# Patient Record
Sex: FEMALE | Race: WHITE | NOT HISPANIC OR LATINO | Employment: FULL TIME | URBAN - METROPOLITAN AREA
[De-identification: names, ages, dates, MRNs, and addresses within clinical notes are randomized per-mention and may not be internally consistent; named-entity substitution may affect disease eponyms.]

---

## 2018-08-07 ENCOUNTER — HOSPITAL ENCOUNTER (EMERGENCY)
Facility: CLINIC | Age: 18
Discharge: HOME | End: 2018-08-07
Attending: EMERGENCY MEDICINE | Admitting: EMERGENCY MEDICINE

## 2018-08-07 ENCOUNTER — APPOINTMENT (OUTPATIENT)
Dept: CT IMAGING | Facility: CLINIC | Age: 18
End: 2018-08-07
Attending: EMERGENCY MEDICINE

## 2018-08-07 VITALS
OXYGEN SATURATION: 99 % | WEIGHT: 130 LBS | SYSTOLIC BLOOD PRESSURE: 108 MMHG | DIASTOLIC BLOOD PRESSURE: 62 MMHG | HEIGHT: 62 IN | BODY MASS INDEX: 23.92 KG/M2 | TEMPERATURE: 98.6 F | HEART RATE: 68 BPM

## 2018-08-07 DIAGNOSIS — R55 SYNCOPE, UNSPECIFIED SYNCOPE TYPE: ICD-10-CM

## 2018-08-07 DIAGNOSIS — S09.90XA CLOSED HEAD INJURY, INITIAL ENCOUNTER: ICD-10-CM

## 2018-08-07 DIAGNOSIS — S16.1XXA STRAIN OF NECK MUSCLE, INITIAL ENCOUNTER: ICD-10-CM

## 2018-08-07 LAB
ANION GAP SERPL CALCULATED.3IONS-SCNC: 7 MMOL/L (ref 3–14)
BASOPHILS # BLD AUTO: 0 10E9/L (ref 0–0.2)
BASOPHILS NFR BLD AUTO: 0.2 %
BUN SERPL-MCNC: 14 MG/DL (ref 7–30)
CALCIUM SERPL-MCNC: 9.4 MG/DL (ref 8.5–10.1)
CHLORIDE SERPL-SCNC: 107 MMOL/L (ref 94–109)
CO2 SERPL-SCNC: 25 MMOL/L (ref 20–32)
CREAT SERPL-MCNC: 0.72 MG/DL (ref 0.52–1.04)
DIFFERENTIAL METHOD BLD: NORMAL
EOSINOPHIL # BLD AUTO: 0 10E9/L (ref 0–0.7)
EOSINOPHIL NFR BLD AUTO: 0.4 %
ERYTHROCYTE [DISTWIDTH] IN BLOOD BY AUTOMATED COUNT: 12.4 % (ref 10–15)
GFR SERPL CREATININE-BSD FRML MDRD: >90 ML/MIN/1.7M2
GLUCOSE SERPL-MCNC: 86 MG/DL (ref 70–99)
HCG UR QL: NEGATIVE
HCT VFR BLD AUTO: 42.9 % (ref 35–47)
HGB BLD-MCNC: 14.1 G/DL (ref 11.7–15.7)
IMM GRANULOCYTES # BLD: 0 10E9/L (ref 0–0.4)
IMM GRANULOCYTES NFR BLD: 0.3 %
LYMPHOCYTES # BLD AUTO: 1.8 10E9/L (ref 0.8–5.3)
LYMPHOCYTES NFR BLD AUTO: 18.9 %
MCH RBC QN AUTO: 30.3 PG (ref 26.5–33)
MCHC RBC AUTO-ENTMCNC: 32.9 G/DL (ref 31.5–36.5)
MCV RBC AUTO: 92 FL (ref 78–100)
MONOCYTES # BLD AUTO: 0.4 10E9/L (ref 0–1.3)
MONOCYTES NFR BLD AUTO: 4.1 %
NEUTROPHILS # BLD AUTO: 7.1 10E9/L (ref 1.6–8.3)
NEUTROPHILS NFR BLD AUTO: 76.1 %
NRBC # BLD AUTO: 0 10*3/UL
NRBC BLD AUTO-RTO: 0 /100
PLATELET # BLD AUTO: 292 10E9/L (ref 150–450)
POTASSIUM SERPL-SCNC: 4.2 MMOL/L (ref 3.4–5.3)
RBC # BLD AUTO: 4.65 10E12/L (ref 3.8–5.2)
SODIUM SERPL-SCNC: 139 MMOL/L (ref 133–144)
WBC # BLD AUTO: 9.3 10E9/L (ref 4–11)

## 2018-08-07 PROCEDURE — 99285 EMERGENCY DEPT VISIT HI MDM: CPT | Mod: 25 | Performed by: EMERGENCY MEDICINE

## 2018-08-07 PROCEDURE — 80048 BASIC METABOLIC PNL TOTAL CA: CPT | Performed by: EMERGENCY MEDICINE

## 2018-08-07 PROCEDURE — 25000128 H RX IP 250 OP 636: Performed by: EMERGENCY MEDICINE

## 2018-08-07 PROCEDURE — 85025 COMPLETE CBC W/AUTO DIFF WBC: CPT | Performed by: EMERGENCY MEDICINE

## 2018-08-07 PROCEDURE — 93010 ELECTROCARDIOGRAM REPORT: CPT | Mod: Z6 | Performed by: EMERGENCY MEDICINE

## 2018-08-07 PROCEDURE — 99285 EMERGENCY DEPT VISIT HI MDM: CPT | Mod: 25

## 2018-08-07 PROCEDURE — 96360 HYDRATION IV INFUSION INIT: CPT

## 2018-08-07 PROCEDURE — 70450 CT HEAD/BRAIN W/O DYE: CPT

## 2018-08-07 PROCEDURE — 93005 ELECTROCARDIOGRAM TRACING: CPT

## 2018-08-07 PROCEDURE — 72125 CT NECK SPINE W/O DYE: CPT

## 2018-08-07 PROCEDURE — 81025 URINE PREGNANCY TEST: CPT | Performed by: EMERGENCY MEDICINE

## 2018-08-07 RX ORDER — SODIUM CHLORIDE, SODIUM LACTATE, POTASSIUM CHLORIDE, CALCIUM CHLORIDE 600; 310; 30; 20 MG/100ML; MG/100ML; MG/100ML; MG/100ML
1000 INJECTION, SOLUTION INTRAVENOUS CONTINUOUS
Status: DISCONTINUED | OUTPATIENT
Start: 2018-08-07 | End: 2018-08-07 | Stop reason: HOSPADM

## 2018-08-07 RX ADMIN — SODIUM CHLORIDE, POTASSIUM CHLORIDE, SODIUM LACTATE AND CALCIUM CHLORIDE 1000 ML: 600; 310; 30; 20 INJECTION, SOLUTION INTRAVENOUS at 09:28

## 2018-08-07 NOTE — ED NOTES
"After syncopal episode pt c/o ha and neck pain from fall.  No numbness/tingling.  = and strong in all extremities.  Pt A&O x3.  Denies cp or soa.  Hx of mild cold otherwise healthy.  Pt states she has had a hx of \"passing out\" in the past due to eating disorder but states she has been eating well and has not passed out for the past three years.  "

## 2018-08-07 NOTE — ED PROVIDER NOTES
History     Chief Complaint   Patient presents with     Loss of Consciousness     pt had syncopal episode at work and hit head on floor.  LOC for about 3-5 min according to what EMS was told     HPI  Marija Langley is a 18 year old female who presents after reported syncopal episode.  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  She reports eating a smoothie this morning feeling well, being at work and standing for approximately an hour at which time she said she started to feel nauseated and the next thing she knew she woke up on the floor.  She was told she hit her head hard when she fell, she complains of left occipital and neck pain, there was no report of seizure activity, no loss control bowel or bladder.  She denies significant headache, nausea, visual change, peripheral numbness weakness or paresthesia.  Denies pain in her back or chest.  She has history of eating disorder, she has been through treatment a couple years ago, is following a meal plan and reportedly doing well with intake on a daily basis.  She reports drinking plenty of fluids.  She has history of syncopal episode when she was not taking care of herself with respect to her eating disorder.  She has had URI symptoms for the past week.  She denies nausea vomiting, chest pain, shortness of air, palpitations, abdominal pain, diarrhea, black or bloody stool, urinary symptoms.  LMP 3 weeks ago, sexually active using condoms.    Problem List:    There are no active problems to display for this patient.       Past Medical History:    No past medical history on file.    Past Surgical History:    No past surgical history on file.    Family History:    No family history on file.    Social History:  Marital Status:    Social History   Substance Use Topics     Smoking status: Not on file     Smokeless tobacco: Not on file     Alcohol use Not on file        Medications:      Acetaminophen (TYLENOL PO)         Review of Systems  All  "other systems reviewed and are negative.    Physical Exam   BP: 107/54  Pulse: 69  Temp: 98.6  F (37  C)  Height: 157.5 cm (5' 2\")  Weight: 59 kg (130 lb)  SpO2: 100 %      Physical Exam  Nontoxic-appearing no respiratory distress alert and oriented x3.  GCS 15 on arrival and discharge    Head atraumatic normocephalic    Cranial nerves; vision baseline fields intact, PERRL, EOMI, facial sensation intact to light touch, facial muscle tone intact and symmetrical, hearing grossly intact,swallowing without difficulty, voice baseline and normal, SCM  strength intact, tongue protrudes midline.  Palatal elevation symmetric    TM's unremarkable, EACs clear, oropharynx moist without lesions or erythema.    Neck rigid collar, left in place secondary to complaint of neck pain after fall    Spine nontender to palpation    Pelvis stable nontender    Lungs clear to auscultation no rales rhonchi or wheezes    Heart regular no murmur S3 or rub    Abdomen soft nontender bowel sounds positive no masses or HSM    Strength and sensation intact throughout the extremities, skin clear from rash or lesion.      ED Course     ED Course     Procedures  ECG: Normal rate and rhythm, axis and intervals within normal limits, no acute ST or T wave changes. Read by Dr. Agustín Herring               Critical Care time:  none     Results for orders placed or performed during the hospital encounter of 08/07/18   CT Head w/o Contrast    Narrative    CT SCAN OF THE HEAD WITHOUT CONTRAST   8/7/2018 9:45 AM     HISTORY: Closed head injury with loss consciousness    TECHNIQUE:  Axial images of the head and coronal reformations without  IV contrast material. Radiation dose for this scan was reduced using  automated exposure control, adjustment of the mA and/or kV according  to patient size, or iterative reconstruction technique.    COMPARISON: None.    FINDINGS:  The ventricles are normal in size, shape and configuration.   The brain parenchyma and " subarachnoid spaces are normal. There is no  evidence of intracranial hemorrhage, mass, acute infarct or anomaly.     The visualized portions of the sinuses and mastoids appear normal.  There is no evidence of trauma.      Impression    IMPRESSION: Normal CT scan of the head.      JESSICA FORD MD   CT Cervical Spine w/o Contrast    Narrative    CT CERVICAL SPINE WITHOUT CONTRAST  8/7/2018 9:46 AM     HISTORY: Neck pain after trauma.      TECHNIQUE: Axial images of the cervical spine were obtained without  intravenous contrast. Multiplanar reformations were performed.  Radiation dose for this scan was reduced using automated exposure  control, adjustment of the mA and/or kV according to patient size, or  iterative reconstruction technique.    COMPARISON: None.    FINDINGS: There is no evidence of fracture.    Alignment: Straightening of lordosis. Mild scoliosis convex to the  left.    Craniocervical Junction and C1-C2: The anterior arch of C1 appears  fused to the tip of the clivus on sagittal images 13 and 14 of series  7, a congenital anomaly. Small congenital defect in the posterior arch  of C1.    C2-C3: Normal disc, facet joints, spinal canal and neural foramina.    C3-C4: Normal disc, facet joints, spinal canal and neural foramina.    C4-C5: Normal disc, facet joints, spinal canal and neural foramina.    C5-C6: Normal disc, facet joints, spinal canal and neural foramina.    C6-C7: Normal disc, facet joints, spinal canal and neural foramina.    C7-T1: Normal disc, facet joints, spinal canal and neural foramina.  Bilateral cervical ribs off C7. The right cervical rib articulates  with the first right thoracic rib.    Paraspinous Soft Tissues: The thyroid gland is small with  heterogeneous parenchymal attenuation and lobulated peripheral surface  suggesting previous thyroiditis.      Impression    IMPRESSION:   1. No evidence of acute trauma.  2. 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  3. Abnormal appearance of the thyroid gland suggesting previous  thyroiditis.    JESSICA FORD MD   CBC with platelets differential   Result Value Ref Range    WBC 9.3 4.0 - 11.0 10e9/L    RBC Count 4.65 3.8 - 5.2 10e12/L    Hemoglobin 14.1 11.7 - 15.7 g/dL    Hematocrit 42.9 35.0 - 47.0 %    MCV 92 78 - 100 fl    MCH 30.3 26.5 - 33.0 pg    MCHC 32.9 31.5 - 36.5 g/dL    RDW 12.4 10.0 - 15.0 %    Platelet Count 292 150 - 450 10e9/L    Diff Method Automated Method     % Neutrophils 76.1 %    % Lymphocytes 18.9 %    % Monocytes 4.1 %    % Eosinophils 0.4 %    % Basophils 0.2 %    % Immature Granulocytes 0.3 %    Nucleated RBCs 0 0 /100    Absolute Neutrophil 7.1 1.6 - 8.3 10e9/L    Absolute Lymphocytes 1.8 0.8 - 5.3 10e9/L    Absolute Monocytes 0.4 0.0 - 1.3 10e9/L    Absolute Eosinophils 0.0 0.0 - 0.7 10e9/L    Absolute Basophils 0.0 0.0 - 0.2 10e9/L    Abs Immature Granulocytes 0.0 0 - 0.4 10e9/L    Absolute Nucleated RBC 0.0    Basic metabolic panel   Result Value Ref Range    Sodium 139 133 - 144 mmol/L    Potassium 4.2 3.4 - 5.3 mmol/L    Chloride 107 94 - 109 mmol/L    Carbon Dioxide 25 20 - 32 mmol/L    Anion Gap 7 3 - 14 mmol/L    Glucose 86 70 - 99 mg/dL    Urea Nitrogen 14 7 - 30 mg/dL    Creatinine 0.72 0.52 - 1.04 mg/dL    GFR Estimate >90 >60 mL/min/1.7m2    GFR Estimate If Black >90 >60 mL/min/1.7m2    Calcium 9.4 8.5 - 10.1 mg/dL   HCG qualitative urine   Result Value Ref Range    HCG Qual Urine Negative NEG^Negative                 Results for orders placed or performed during the hospital encounter of 08/07/18 (from the past 24 hour(s))   CBC with platelets differential   Result Value Ref Range    WBC 9.3 4.0 - 11.0 10e9/L    RBC Count 4.65 3.8 - 5.2 10e12/L    Hemoglobin 14.1 11.7 - 15.7 g/dL    Hematocrit 42.9 35.0 - 47.0 %    MCV 92 78 - 100 fl    MCH 30.3 26.5 - 33.0 pg     MCHC 32.9 31.5 - 36.5 g/dL    RDW 12.4 10.0 - 15.0 %    Platelet Count 292 150 - 450 10e9/L    Diff Method Automated Method     % Neutrophils 76.1 %    % Lymphocytes 18.9 %    % Monocytes 4.1 %    % Eosinophils 0.4 %    % Basophils 0.2 %    % Immature Granulocytes 0.3 %    Nucleated RBCs 0 0 /100    Absolute Neutrophil 7.1 1.6 - 8.3 10e9/L    Absolute Lymphocytes 1.8 0.8 - 5.3 10e9/L    Absolute Monocytes 0.4 0.0 - 1.3 10e9/L    Absolute Eosinophils 0.0 0.0 - 0.7 10e9/L    Absolute Basophils 0.0 0.0 - 0.2 10e9/L    Abs Immature Granulocytes 0.0 0 - 0.4 10e9/L    Absolute Nucleated RBC 0.0    Basic metabolic panel   Result Value Ref Range    Sodium 139 133 - 144 mmol/L    Potassium 4.2 3.4 - 5.3 mmol/L    Chloride 107 94 - 109 mmol/L    Carbon Dioxide 25 20 - 32 mmol/L    Anion Gap 7 3 - 14 mmol/L    Glucose 86 70 - 99 mg/dL    Urea Nitrogen 14 7 - 30 mg/dL    Creatinine 0.72 0.52 - 1.04 mg/dL    GFR Estimate >90 >60 mL/min/1.7m2    GFR Estimate If Black >90 >60 mL/min/1.7m2    Calcium 9.4 8.5 - 10.1 mg/dL   HCG qualitative urine   Result Value Ref Range    HCG Qual Urine Negative NEG^Negative   CT Head w/o Contrast    Narrative    CT SCAN OF THE HEAD WITHOUT CONTRAST   8/7/2018 9:45 AM     HISTORY: Closed head injury with loss consciousness    TECHNIQUE:  Axial images of the head and coronal reformations without  IV contrast material. Radiation dose for this scan was reduced using  automated exposure control, adjustment of the mA and/or kV according  to patient size, or iterative reconstruction technique.    COMPARISON: None.    FINDINGS:  The ventricles are normal in size, shape and configuration.   The brain parenchyma and subarachnoid spaces are normal. There is no  evidence of intracranial hemorrhage, mass, acute infarct or anomaly.     The visualized portions of the sinuses and mastoids appear normal.  There is no evidence of trauma.      Impression    IMPRESSION: Normal CT scan of the head.      JESSICA FORD  MD   CT Cervical Spine w/o Contrast    Narrative    CT CERVICAL SPINE WITHOUT CONTRAST  8/7/2018 9:46 AM     HISTORY: Neck pain after trauma.      TECHNIQUE: Axial images of the cervical spine were obtained without  intravenous contrast. Multiplanar reformations were performed.  Radiation dose for this scan was reduced using automated exposure  control, adjustment of the mA and/or kV according to patient size, or  iterative reconstruction technique.    COMPARISON: None.    FINDINGS: There is no evidence of fracture.    Alignment: Straightening of lordosis. Mild scoliosis convex to the  left.    Craniocervical Junction and C1-C2: The anterior arch of C1 appears  fused to the tip of the clivus on sagittal images 13 and 14 of series  7, a congenital anomaly. Small congenital defect in the posterior arch  of C1.    C2-C3: Normal disc, facet joints, spinal canal and neural foramina.    C3-C4: Normal disc, facet joints, spinal canal and neural foramina.    C4-C5: Normal disc, facet joints, spinal canal and neural foramina.    C5-C6: Normal disc, facet joints, spinal canal and neural foramina.    C6-C7: Normal disc, facet joints, spinal canal and neural foramina.    C7-T1: Normal disc, facet joints, spinal canal and neural foramina.  Bilateral cervical ribs off C7. The right cervical rib articulates  with the first right thoracic rib.    Paraspinous Soft Tissues: The thyroid gland is small with  heterogeneous parenchymal attenuation and lobulated peripheral surface  suggesting previous thyroiditis.      Impression    IMPRESSION:   1. No evidence of acute trauma.  2. 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  3. Abnormal appearance of the thyroid gland suggesting previous  thyroiditis.    JESSICA FORD MD       Medications   lactated ringers BOLUS 1,000 mL (1,000 mLs Intravenous  New Bag 8/7/18 0928)     Followed by   lactated ringers infusion (not administered)       Assessments & Plan (with Medical Decision Making)  18-year-old female presents after syncopal episode with prolonged reported loss consciousness.  No evidence for seizure.  Usual differential considered, unrevealing workup including CT scan head and neck, CBC, BMP and urine pregnancy.  She remained stable throughout her stay without ectopy or further faintness.  She received 1 L normal saline.  Discussed possible etiologies, vasovagal most likely diagnosis.  Patient suffered closed head injury and discussed concussion symptoms.  Reviewed return criteria, she expressed understanding and agreement, all questions answered.     I have reviewed the nursing notes.    I have reviewed the findings, diagnosis, plan and need for follow up with the patient.       New Prescriptions    No medications on file       Final diagnoses:   Syncope, unspecified syncope type   Closed head injury, initial encounter   Strain of neck muscle, initial encounter       8/7/2018   [facility] EMERGENCY DEPARTMENT     Agustín Herring MD  08/07/18 5713

## 2018-08-07 NOTE — ED AVS SNAPSHOT
[facility] Emergency Department    [address] [address]    Phone:  [phone]    Fax:  [phone]                                       Marija Langley   MRN: [id number]    Department:  [facility] Emergency Department   Date of Visit:  8/7/2018           After Visit Summary Signature Page     I have received my discharge instructions, and my questions have been answered. I have discussed any challenges I see with this plan with the nurse or doctor.    ..........................................................................................................................................  Patient/Patient Representative Signature      ..........................................................................................................................................  Patient Representative Print Name and Relationship to Patient    ..................................................               ................................................  Date                                            Time    ..........................................................................................................................................  Reviewed by Signature/Title    ...................................................              ..............................................  Date                                                            Time

## 2018-08-07 NOTE — DISCHARGE INSTRUCTIONS
Understanding Cervical Strain    There are 7 bones (vertebrae) in the neck that are part of the spine. These are called the cervical spine. Cervical strain is a medical term for neck pain. The neck has several layers of muscles. These are connected with tendons to the cervical spine and other bones. Neck pain is often the result of injury to these muscles and tendons.  Causes of cervical strain  Different types of stress on the neck can damage muscles and tendons (soft tissues) and cause cervical strain. Cervical tissues can be damaged by:    The neck being forced past its normal range of motion, such as in a car accident or sports injury    Constant, low-level stress, such as from poor posture or a poorly set-up workspace  Symptoms of cervical strain  These may include:    Neck pain or stiffness    Pain in the shoulders or upper back    Muscle spasms    Headache, often starting at the base of the neck    Irritability, difficulty concentrating, or sleeplessness  Treatment for cervical strain  This problem often gets better on its own. Treatments aim to reduce pain and inflammation and increase the range of motion of the neck. Possible treatments include:    Over-the-counter or prescription pain medicine. These help relieve pain and inflammation.    Stretching exercises to decrease neck stiffness.    Massage to decrease neck stiffness.    Cold or heat pack. These help reduce pain and swelling.  Call 911  Call 911 right away if you have any of these:    Face drooping or numbness    Numbness or weakness, especially in the arms or on one side    Slurred speech or difficulty speaking    Blurred vision   When to call your healthcare provider  Call your healthcare provider right away if you have any of these:    Fever of 100.4 F (38 C) or higher, or as directed    Pain or stiffness that gets worse    Symptoms that don t get better, or get worse    Numbness, tingling, weakness or shooting pains into the arms or  legs    New symptoms  Date Last Reviewed: 3/10/2016    6787-8649 [employer]. [address] [facility], [place] [address]. All rights reserved. This information is not intended as a substitute for professional medical care. Always follow your healthcare professional's instructions.          After a Concussion     Awaken to check alertness as often as the health care provider suggests.     If you had a mild concussion (a head injury), watch closely for signs of problems during the first 48 hours after the injury. Follow the doctor s advice about recovering at home. Use the tips on this handout as a guide.  Note: You should not be left alone after a concussion. If no adult can stay with the injured person, let the doctor know.  Have someone call 911 or your emergency number if you can't fully wake up or have a seizures or convulsions.   The first 48 hours  Don t take medicine unless approved by your healthcare provider. Try placing a cold, damp cloth on your head to help relieve a headache.    Ask the doctor before using any medicines.    Don't drink alcohol or take sedatives or medicines that make you sleepy.    Don't return to sports or any activity that could cause you to hit your head until all symptoms are gone and you have been cleared by your doctor. A second head injury before fully recovering from the first one can lead to serious brain injury.    Don't do activities that need a lot of concentration or a lot of attention. This will allow your brain to rest and heal more quickly.    Return to regular physical and mental activity as directed and approved by your healthcare provider.  Tips about sleeping  For the first day or two, it may be best not to sleep for long periods of time without being checked for alertness. Follow the doctor s instructions.  ? Have someone wake you every ____ hours for the next ____ hours. He or she should ask you questions to check for alertness.  ? OK to sleep through  the night.     When to call the healthcare provider  If you notice any of the following, call the healthcare provider:    Vomiting. Some vomiting is common, but tell the provider about any vomiting.    Clear or bloody drainage from the nose or ear    Constant drowsiness or difficulty in waking up    Confusion or memory loss    Blurred vision or any vision changes    Inability to walk or talk normally    Increased weakness or problems with coordination    Constant, unrelieved headache that becomes more severe    Changes in behavior or personality    High-pitched crying in infants    Signs of stroke such as paralysis of parts of the body    Uncrontrolled movements suggesting a seizure   Date Last Reviewed: 12/1/2017 2000-2017 [employer]. [address] [place], [place] [id number]. All rights reserved. This information is not intended as a substitute for professional medical care. Always follow your healthcare professional's instructions.        Fainting: Uncertain Cause  Fainting (syncope) is a temporary loss of consciousness, which is often associated with a loss of postural tone. There are other causes of fainting, too. It s also called passing out. It occurs when blood flow to the brain is less than normal. Near-fainting (near-syncope) is very similar to fainting, but you don t fully pass out.  Common minor causes of fainting include:    Sudden fear    Pain    Nausea    Emotional stress    Overexertion  Suddenly standing up after sitting or lying for a long time can also cause fainting.  More serious causes of fainting include:    Very slow or very fast heartbeat (arrhythmia)    Other types of heart disease, such as heart valve disease or coronary artery disease    Dehydration    Loss of blood    Seizure    Stroke    Ruptured blood vessel in the brain  Taking too much high blood pressure medicine can also cause low blood pressure and fainting.  Your healthcare provider does not know the exact cause  of your fainting. But the tests today did not show any of the serious causes of fainting. Sometimes you may need more tests to find out if you have a serious problem. That s why it s important to follow up with your provider as advised.  Home care  Follow these guidelines when caring for yourself at home:    Rest today. You may go back to your normal activities when you are feeling back to normal. It is best to stay with someone who can check on you for the next 24 hours to watch for another episode of fainting.    If you become lightheaded or dizzy, lie down right away and try to prop your feet above the level of your head. Or sit with your head between your knees.    Because the provider doesn t know the exact cause of your fainting or near-fainting spell, it s possible for you to have another spell without warning. Because of this, don t drive a car or operate dangerous equipment. Don t take a bath alone. Use a shower instead. Don t swim alone until your healthcare provider says that you are no longer in danger of having another fainting spell.  Follow-up care  Follow up with your healthcare provider, or as advised.  Call 911  Call 911 if any of these occur:    Another fainting spell that s not explained by the common causes listed above    Pain in your chest, arm, neck, jaw, back, or abdomen    Shortness of breath    Severe headache or seizure    Blood in vomit or stools (black or red color)    Unexpected vaginal bleeding    Your heart beats very rapidly, very slowly, or irregularly (palpitations)    Weakness in an arm or leg or on one side of the face    Difficulty speaking or seeing    Extreme drowsiness, confusion, dizziness, or fainting  Date Last Reviewed: 12/1/2016 2000-2017 [employer]. [address] [place] [address], [place] [address]. All rights reserved. This information is not intended as a substitute for professional medical care. Always follow your healthcare professional's  instructions.

## 2018-08-07 NOTE — ED AVS SNAPSHOT
[facility] Emergency Department    [address]    [facility] [address]    Phone:  [phone]    Fax:  [phone]                                       Marija Langley   MRN: [id number]    Department:  [facility] Emergency Department   Date of Visit:  8/7/2018           Patient Information     Date Of Birth          2000        Your diagnoses for this visit were:     Syncope, unspecified syncope type     Closed head injury, initial encounter     Strain of neck muscle, initial encounter        You were seen by Agustín Herring MD.        Discharge Instructions           Understanding Cervical Strain    There are 7 bones (vertebrae) in the neck that are part of the spine. These are called the cervical spine. Cervical strain is a medical term for neck pain. The neck has several layers of muscles. These are connected with tendons to the cervical spine and other bones. Neck pain is often the result of injury to these muscles and tendons.  Causes of cervical strain  Different types of stress on the neck can damage muscles and tendons (soft tissues) and cause cervical strain. Cervical tissues can be damaged by:    The neck being forced past its normal range of motion, such as in a car accident or sports injury    Constant, low-level stress, such as from poor posture or a poorly set-up workspace  Symptoms of cervical strain  These may include:    Neck pain or stiffness    Pain in the shoulders or upper back    Muscle spasms    Headache, often starting at the base of the neck    Irritability, difficulty concentrating, or sleeplessness  Treatment for cervical strain  This problem often gets better on its own. Treatments aim to reduce pain and inflammation and increase the range of motion of the neck. Possible treatments include:    Over-the-counter or prescription pain medicine. These help relieve pain and inflammation.    Stretching exercises to decrease neck stiffness.    Massage to decrease neck  stiffness.    Cold or heat pack. These help reduce pain and swelling.  Call 911  Call 911 right away if you have any of these:    Face drooping or numbness    Numbness or weakness, especially in the arms or on one side    Slurred speech or difficulty speaking    Blurred vision   When to call your healthcare provider  Call your healthcare provider right away if you have any of these:    Fever of 100.4 F (38 C) or higher, or as directed    Pain or stiffness that gets worse    Symptoms that don t get better, or get worse    Numbness, tingling, weakness or shooting pains into the arms or legs    New symptoms  Date Last Reviewed: 3/10/2016    [id number] [employer]. [address] [place] [address], [place] [address]. All rights reserved. This information is not intended as a substitute for professional medical care. Always follow your healthcare professional's instructions.          After a Concussion     Awaken to check alertness as often as the health care provider suggests.     If you had a mild concussion (a head injury), watch closely for signs of problems during the first 48 hours after the injury. Follow the doctor s advice about recovering at home. Use the tips on this handout as a guide.  Note: You should not be left alone after a concussion. If no adult can stay with the injured person, let the doctor know.  Have someone call 911 or your emergency number if you can't fully wake up or have a seizures or convulsions.   The first 48 hours  Don t take medicine unless approved by your healthcare provider. Try placing a cold, damp cloth on your head to help relieve a headache.    Ask the doctor before using any medicines.    Don't drink alcohol or take sedatives or medicines that make you sleepy.    Don't return to sports or any activity that could cause you to hit your head until all symptoms are gone and you have been cleared by your doctor. A second head injury before fully recovering from the first one can  lead to serious brain injury.    Don't do activities that need a lot of concentration or a lot of attention. This will allow your brain to rest and heal more quickly.    Return to regular physical and mental activity as directed and approved by your healthcare provider.  Tips about sleeping  For the first day or two, it may be best not to sleep for long periods of time without being checked for alertness. Follow the doctor s instructions.  ? Have someone wake you every ____ hours for the next ____ hours. He or she should ask you questions to check for alertness.  ? OK to sleep through the night.     When to call the healthcare provider  If you notice any of the following, call the healthcare provider:    Vomiting. Some vomiting is common, but tell the provider about any vomiting.    Clear or bloody drainage from the nose or ear    Constant drowsiness or difficulty in waking up    Confusion or memory loss    Blurred vision or any vision changes    Inability to walk or talk normally    Increased weakness or problems with coordination    Constant, unrelieved headache that becomes more severe    Changes in behavior or personality    High-pitched crying in infants    Signs of stroke such as paralysis of parts of the body    Uncrontrolled movements suggesting a seizure   Date Last Reviewed: 12/1/2017 2000-2017 [employer]. [address] [place] [address]. All rights reserved. This information is not intended as a substitute for professional medical care. Always follow your healthcare professional's instructions.        Fainting: Uncertain Cause  Fainting (syncope) is a temporary loss of consciousness, which is often associated with a loss of postural tone. There are other causes of fainting, too. It s also called passing out. It occurs when blood flow to the brain is less than normal. Near-fainting (near-syncope) is very similar to fainting, but you don t fully pass out.  Common minor causes of  fainting include:    Sudden fear    Pain    Nausea    Emotional stress    Overexertion  Suddenly standing up after sitting or lying for a long time can also cause fainting.  More serious causes of fainting include:    Very slow or very fast heartbeat (arrhythmia)    Other types of heart disease, such as heart valve disease or coronary artery disease    Dehydration    Loss of blood    Seizure    Stroke    Ruptured blood vessel in the brain  Taking too much high blood pressure medicine can also cause low blood pressure and fainting.  Your healthcare provider does not know the exact cause of your fainting. But the tests today did not show any of the serious causes of fainting. Sometimes you may need more tests to find out if you have a serious problem. That s why it s important to follow up with your provider as advised.  Home care  Follow these guidelines when caring for yourself at home:    Rest today. You may go back to your normal activities when you are feeling back to normal. It is best to stay with someone who can check on you for the next 24 hours to watch for another episode of fainting.    If you become lightheaded or dizzy, lie down right away and try to prop your feet above the level of your head. Or sit with your head between your knees.    Because the provider doesn t know the exact cause of your fainting or near-fainting spell, it s possible for you to have another spell without warning. Because of this, don t drive a car or operate dangerous equipment. Don t take a bath alone. Use a shower instead. Don t swim alone until your healthcare provider says that you are no longer in danger of having another fainting spell.  Follow-up care  Follow up with your healthcare provider, or as advised.  Call 911  Call 911 if any of these occur:    Another fainting spell that s not explained by the common causes listed above    Pain in your chest, arm, neck, jaw, back, or abdomen    Shortness of breath    Severe  headache or seizure    Blood in vomit or stools (black or red color)    Unexpected vaginal bleeding    Your heart beats very rapidly, very slowly, or irregularly (palpitations)    Weakness in an arm or leg or on one side of the face    Difficulty speaking or seeing    Extreme drowsiness, confusion, dizziness, or fainting  Date Last Reviewed: 12/1/2016 2000-2017 [employer]. [address] [place] [address]. All rights reserved. This information is not intended as a substitute for professional medical care. Always follow your healthcare professional's instructions.          24 Hour Appointment Hotline       To make an appointment at any [facility], call [phone] ([phone]). If you don't have a family doctor or clinic, we will help you find one. [facility] clinics are conveniently located to serve the needs of you and your family.             Review of your medicines      Our records show that you are taking the medicines listed below. If these are incorrect, please call your family doctor or clinic.        Dose / Directions Last dose taken    TYLENOL PO   Dose:  325 mg        Take 325 mg by mouth as needed for mild pain or fever   Refills:  0                Procedures and tests performed during your visit     Basic metabolic panel    CBC with platelets differential    CT Cervical Spine w/o Contrast    CT Head w/o Contrast    EKG 12 lead    HCG qualitative urine      Orders Needing Specimen Collection     None      Pending Results     No orders found from 8/5/2018 to 8/8/2018.            Pending Culture Results     No orders found from 8/5/2018 to 8/8/2018.            Pending Results Instructions     If you had any lab results that were not finalized at the time of your Discharge, you can call the ED Lab Result RN at [phone]. You will be contacted by this team for any positive Lab results or changes in treatment. The nurses are available 7 days a week from 10A to 6:30P.   You can leave a message 24 hours per day and they will return your call.        Test Results From Your Hospital Stay        8/7/2018  9:31 AM      Component Results     Component Value Ref Range & Units Status    WBC 9.3 4.0 - 11.0 10e9/L Final    RBC Count 4.65 3.8 - 5.2 10e12/L Final    Hemoglobin 14.1 11.7 - 15.7 g/dL Final    Hematocrit 42.9 35.0 - 47.0 % Final    MCV 92 78 - 100 fl Final    MCH 30.3 26.5 - 33.0 pg Final    MCHC 32.9 31.5 - 36.5 g/dL Final    RDW 12.4 10.0 - 15.0 % Final    Platelet Count 292 150 - 450 10e9/L Final    Diff Method Automated Method  Final    % Neutrophils 76.1 % Final    % Lymphocytes 18.9 % Final    % Monocytes 4.1 % Final    % Eosinophils 0.4 % Final    % Basophils 0.2 % Final    % Immature Granulocytes 0.3 % Final    Nucleated RBCs 0 0 /100 Final    Absolute Neutrophil 7.1 1.6 - 8.3 10e9/L Final    Absolute Lymphocytes 1.8 0.8 - 5.3 10e9/L Final    Absolute Monocytes 0.4 0.0 - 1.3 10e9/L Final    Absolute Eosinophils 0.0 0.0 - 0.7 10e9/L Final    Absolute Basophils 0.0 0.0 - 0.2 10e9/L Final    Abs Immature Granulocytes 0.0 0 - 0.4 10e9/L Final    Absolute Nucleated RBC 0.0  Final         8/7/2018  9:55 AM      Component Results     Component Value Ref Range & Units Status    Sodium 139 133 - 144 mmol/L Final    Potassium 4.2 3.4 - 5.3 mmol/L Final    Chloride 107 94 - 109 mmol/L Final    Carbon Dioxide 25 20 - 32 mmol/L Final    Anion Gap 7 3 - 14 mmol/L Final    Glucose 86 70 - 99 mg/dL Final    Urea Nitrogen 14 7 - 30 mg/dL Final    Creatinine 0.72 0.52 - 1.04 mg/dL Final    GFR Estimate >90 >60 mL/min/1.7m2 Final    Non  GFR Calc    GFR Estimate If Black >90 >60 mL/min/1.7m2 Final    African American GFR Calc    Calcium 9.4 8.5 - 10.1 mg/dL Final         8/7/2018  9:40 AM      Component Results     Component Value Ref Range & Units Status    HCG Qual Urine Negative NEG^Negative Final    This test is for screening purposes.  Results should be interpreted  along with   the clinical picture.  Confirmation testing is available if warranted by   ordering CBR035, HCG Quantitative Pregnancy.           8/7/2018  9:48 AM      Narrative     CT SCAN OF THE HEAD WITHOUT CONTRAST   8/7/2018 9:45 AM     HISTORY: Closed head injury with loss consciousness    TECHNIQUE:  Axial images of the head and coronal reformations without  IV contrast material. Radiation dose for this scan was reduced using  automated exposure control, adjustment of the mA and/or kV according  to patient size, or iterative reconstruction technique.    COMPARISON: None.    FINDINGS:  The ventricles are normal in size, shape and configuration.   The brain parenchyma and subarachnoid spaces are normal. There is no  evidence of intracranial hemorrhage, mass, acute infarct or anomaly.     The visualized portions of the sinuses and mastoids appear normal.  There is no evidence of trauma.        Impression     IMPRESSION: Normal CT scan of the head.      JESSICA FORD MD         8/7/2018 10:07 AM      Narrative     CT CERVICAL SPINE WITHOUT CONTRAST  8/7/2018 9:46 AM     HISTORY: Neck pain after trauma.      TECHNIQUE: Axial images of the cervical spine were obtained without  intravenous contrast. Multiplanar reformations were performed.  Radiation dose for this scan was reduced using automated exposure  control, adjustment of the mA and/or kV according to patient size, or  iterative reconstruction technique.    COMPARISON: None.    FINDINGS: There is no evidence of fracture.    Alignment: Straightening of lordosis. Mild scoliosis convex to the  left.    Craniocervical Junction and C1-C2: The anterior arch of C1 appears  fused to the tip of the clivus on sagittal images 13 and 14 of series  7, a congenital anomaly. Small congenital defect in the posterior arch  of C1.    C2-C3: Normal disc, facet joints, spinal canal and neural foramina.    C3-C4: Normal disc, facet joints, spinal canal and neural  "foramina.    C4-C5: Normal disc, facet joints, spinal canal and neural foramina.    C5-C6: Normal disc, facet joints, spinal canal and neural foramina.    C6-C7: Normal disc, facet joints, spinal canal and neural foramina.    C7-T1: Normal disc, facet joints, spinal canal and neural foramina.  Bilateral cervical ribs off C7. The right cervical rib articulates  with the first right thoracic rib.    Paraspinous Soft Tissues: The thyroid gland is small with  heterogeneous parenchymal attenuation and lobulated peripheral surface  suggesting previous thyroiditis.        Impression     IMPRESSION:   1. No evidence of acute trauma.  2. 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  3. Abnormal appearance of the thyroid gland suggesting previous  thyroiditis.    JESSICA FORD MD                Thank you for choosing [facility]       Thank you for choosing [facility] for your care. Our goal is always to provide you with excellent care. Hearing back from our patients is one way we can continue to improve our services. Please take a few minutes to complete the written survey that you may receive in the mail after you visit with us. Thank you!        [employer] Information     [employer] lets you send messages to your doctor, view your test results, renew your prescriptions, schedule appointments and more. To sign up, go to [email or web address] . Click on \"Log in\" on the left side of the screen, which will take you to the Welcome page. Then click on \"Sign up Now\" on the right side of the page.     You will be asked to enter the access code listed below, as well as some personal information. Please follow the directions to create your username and password.     Your access code is: [email or web address]  Expires: 2018 10:44 AM     Your access code will  in 90 days. If you need help or a new " code, please call your [facility] clinic or [phone].        Care EveryWhere ID     This is your Care EveryWhere ID. This could be used by other organizations to access your [facility] medical records  [id number]        Equal Access to Services     NELLA GAN : Ioana underwood Soneel, waaxda luqadaha, qaybta kaalmada treva, rani cannon. So [facility] [phone].    ATENCIÓN: Si habla español, tiene a powell disposición servicios gratuitos de asistencia lingüística. Llame al [phone].    We comply with applicable federal civil rights laws and [place] laws. We do not discriminate on the basis of race, color, national origin, age, disability, sex, sexual orientation, or gender identity.            After Visit Summary       This is your record. Keep this with you and show to your community pharmacist(s) and doctor(s) at your next visit.

## 2020-08-20 ENCOUNTER — OFFICE VISIT (OUTPATIENT)
Dept: URGENT CARE | Facility: URGENT CARE | Age: 20
End: 2020-08-20
Payer: COMMERCIAL

## 2020-08-20 VITALS
TEMPERATURE: 99 F | OXYGEN SATURATION: 100 % | SYSTOLIC BLOOD PRESSURE: 102 MMHG | RESPIRATION RATE: 16 BRPM | DIASTOLIC BLOOD PRESSURE: 60 MMHG | HEART RATE: 79 BPM

## 2020-08-20 DIAGNOSIS — N39.0 URINARY TRACT INFECTION WITHOUT HEMATURIA, SITE UNSPECIFIED: ICD-10-CM

## 2020-08-20 DIAGNOSIS — R82.90 ABNORMAL URINALYSIS: Primary | ICD-10-CM

## 2020-08-20 DIAGNOSIS — R82.90 NONSPECIFIC FINDING ON EXAMINATION OF URINE: ICD-10-CM

## 2020-08-20 LAB
ALBUMIN UR-MCNC: 30 MG/DL
APPEARANCE UR: CLEAR
BACTERIA #/AREA URNS HPF: ABNORMAL /HPF
BILIRUB UR QL STRIP: NEGATIVE
COLOR UR AUTO: YELLOW
GLUCOSE UR STRIP-MCNC: NEGATIVE MG/DL
HGB UR QL STRIP: ABNORMAL
KETONES UR STRIP-MCNC: NEGATIVE MG/DL
LEUKOCYTE ESTERASE UR QL STRIP: ABNORMAL
NITRATE UR QL: NEGATIVE
NON-SQ EPI CELLS #/AREA URNS LPF: ABNORMAL /LPF
PH UR STRIP: 5.5 PH (ref 5–7)
RBC #/AREA URNS AUTO: ABNORMAL /HPF
SOURCE: ABNORMAL
SP GR UR STRIP: 1.02 (ref 1–1.03)
UROBILINOGEN UR STRIP-ACNC: 0.2 EU/DL (ref 0.2–1)
WBC #/AREA URNS AUTO: ABNORMAL /HPF
WBC CLUMPS #/AREA URNS HPF: PRESENT /HPF

## 2020-08-20 PROCEDURE — 87086 URINE CULTURE/COLONY COUNT: CPT | Performed by: EMERGENCY MEDICINE

## 2020-08-20 PROCEDURE — 81001 URINALYSIS AUTO W/SCOPE: CPT | Performed by: EMERGENCY MEDICINE

## 2020-08-20 PROCEDURE — 87088 URINE BACTERIA CULTURE: CPT | Performed by: EMERGENCY MEDICINE

## 2020-08-20 PROCEDURE — 87186 SC STD MICRODIL/AGAR DIL: CPT | Performed by: EMERGENCY MEDICINE

## 2020-08-20 PROCEDURE — 99203 OFFICE O/P NEW LOW 30 MIN: CPT | Performed by: EMERGENCY MEDICINE

## 2020-08-20 RX ORDER — PHENAZOPYRIDINE HYDROCHLORIDE 200 MG/1
200 TABLET, FILM COATED ORAL 3 TIMES DAILY PRN
Qty: 6 TABLET | Refills: 0 | Status: SHIPPED | OUTPATIENT
Start: 2020-08-20 | End: 2021-05-24

## 2020-08-20 RX ORDER — CIPROFLOXACIN 500 MG/1
500 TABLET, FILM COATED ORAL 2 TIMES DAILY
Qty: 10 TABLET | Refills: 0 | Status: SHIPPED | OUTPATIENT
Start: 2020-08-20 | End: 2020-08-24

## 2020-08-20 SDOH — HEALTH STABILITY: MENTAL HEALTH: HOW OFTEN DO YOU HAVE A DRINK CONTAINING ALCOHOL?: NEVER

## 2020-08-21 NOTE — NURSING NOTE
"Chief Complaint   Patient presents with     Urinary Problem     urine frequency and urgency - burns with uringation with sensation to go all the time  x 1 week       Initial /60 (BP Location: Right arm)   Pulse 79   Temp 99  F (37.2  C) (Tympanic)   Resp 16   LMP 08/03/2020   SpO2 100%  Estimated body mass index is 23.78 kg/m  as calculated from the following:    Height as of 8/7/18: 1.575 m (5' 2\").    Weight as of 8/7/18: 59 kg (130 lb).    Patient presents to the clinic using No DME    Health Maintenance that is potentially due pending provider review:  NONE    n/a    Is there anyone who you would like to be able to receive your results? No  If yes have patient fill out ANGIE    "

## 2020-08-21 NOTE — RESULT ENCOUNTER NOTE
Emergency Dept/Urgent Care discharge antibiotic (if prescribed): Ciprofloxacin (Cipro) 500 mg tablet, 1 tablet (500 mg) by mouth 2 times daily for 5 days.  Date of Rx (if applicable):  8/20/20  No changes in treatment per Urine culture protocol.

## 2020-08-21 NOTE — PATIENT INSTRUCTIONS
Start medication tonight    Lots of fluids    Recheck if feeling more ill such as fever, nausea or pain    Recheck 3 to 4 days if symptoms do not improve  Patient Education     Understanding Urinary Tract Infections (UTIs)  Most UTIs are caused by bacteria, although they may also be caused by viruses or fungi. Bacteria from the bowel are the most common source of infection. The infection may start because of any of the following:  Sexual activity. During sex, bacteria can travel from the penis, vagina, or rectum into the urethra.   Bacteria on the skin outside the rectum may travel into the urethra. This is more common in women since the rectum and urethra are closer to each other than in men. Wiping from front to back after using the toilet and keeping the area clean can help prevent germs from getting to the urethra.  Blockage of urine flow through the urinary tract. If urine sits too long, germs may start to grow out of control.      Parts of the urinary tract  The infection can occur in any part of the urinary tract.  The kidneys collect and store urine.  The ureters carry urine from the kidneys to the bladder.  The bladder holds urine until you are ready to let it out.  The urethra carries urine from the bladder out of the body. It is shorter in women, so bacteria can move through it more easily. The urethra is longer in men, so a UTI is less likely to reach the bladder or kidneys in men.  Date Last Reviewed: 1/1/2017 2000-2019 [employer]. [address] [place] [address]. All rights reserved. This information is not intended as a substitute for professional medical care. Always follow your healthcare professional's instructions.

## 2020-08-21 NOTE — PROGRESS NOTES
HPI: Patient is a 20-year-old female who is had a one-week history of UTI symptoms to include dysuria, urgency, frequency.  No fever chills.  She is got some slight lower back pain but has no pain in the area of her kidneys.  She had a history of bladder surgery x2 as a young child last UTI with many months ago.  She has been very slightly nauseous.      ROS: See HPI otherwise normal.    Allergies   Allergen Reactions     Augmentin      Nitrofurantoin Nausea and Vomiting and GI Disturbance     Sulfa Drugs       Current Outpatient Medications   Medication Sig Dispense Refill     Acetaminophen (TYLENOL PO) Take 325 mg by mouth as needed for mild pain or fever       ciprofloxacin (CIPRO) 500 MG tablet Take 1 tablet (500 mg) by mouth 2 times daily for 5 days 10 tablet 0     phenazopyridine (PYRIDIUM) 200 MG tablet Take 1 tablet (200 mg) by mouth 3 times daily as needed for irritation 6 tablet 0         PE: Physical exam reveals a 20-year-old female who appears in no acute distress.  Examination her abdomen reveals no suprapubic tenderness or clinical evidence of a distended bladder.  Back reveals no CVA tenderness.  Skin warm and moist.        TREATMENT: UA shows typical stigmata of urinary tract infection      ASSESSMENT: UTI with no symptoms to suggest complication      DIAGNOSIS: UTI      PLAN: Cipro and Pyridium as instructed.  Recheck 3 to 4 days if no improvement, sooner if worse

## 2020-08-24 ENCOUNTER — TELEPHONE (OUTPATIENT)
Dept: URGENT CARE | Facility: URGENT CARE | Age: 20
End: 2020-08-24

## 2020-08-24 DIAGNOSIS — N39.0 URINARY TRACT INFECTION: ICD-10-CM

## 2020-08-24 LAB
BACTERIA SPEC CULT: ABNORMAL
Lab: ABNORMAL
SPECIMEN SOURCE: ABNORMAL

## 2020-08-24 RX ORDER — CEPHALEXIN 500 MG/1
500 CAPSULE ORAL 2 TIMES DAILY
Qty: 14 CAPSULE | Refills: 0 | Status: SHIPPED | OUTPATIENT
Start: 2020-08-24 | End: 2020-08-31

## 2020-08-24 NOTE — TELEPHONE ENCOUNTER
[facility] Lab result notification [Adult]    [facility] ED lab result protocol used  Urine culture  Reason for call  Notify of lab results, assess symptoms,  review Urgent Care providers recommendations/discharge instructions (if necessary) and advise per [facility] lab result f/u protocol    Lab Result (including Rx patient on, if applicable)  Final Urine Culture Report on 8/24/2020  Emergency Dept/Urgent Care discharge antibiotic prescribed: Ciprofloxacin (Cipro) 500 mg tablet, 1 tablet (500 mg) by mouth 2 times daily for 5 days.   #1. Bacteria, 50,000 to 100,000 colonies/mL Escherichia coli,  is [INTERMEDIATE SUSCEPTIBLE] to antibiotic.   Change in treatment as per [facility] Lab result protocol.  Information table from Urgent Provider visit on 8/20/2020  Symptoms reported at Urgent Care visit (Chief complaint, HPI) HPI: Patient is a 20-year-old female who is had a one-week history of UTI symptoms to include dysuria, urgency, frequency.  No fever chills.  She is got some slight lower back pain but has no pain in the area of her kidneys.  She had a history of bladder surgery x2 as a young child last UTI with many months ago.  She has been very slightly nauseous.   Significant Medical hx, if applicable (i.e. CKD, diabetes) None   Allergies Allergies   Allergen Reactions     Augmentin      Nitrofurantoin Nausea and Vomiting and GI Disturbance     Sulfa Drugs       Weight, if applicable Wt Readings from Last 2 Encounters:   08/07/18 59 kg (130 lb)      Coumadin/Warfarin [Yes /No] No   Creatinine Level (mg/dl) Creatinine   Date Value Ref Range Status   08/07/2018 0.72 0.52 - 1.04 mg/dL Final      Creatinine clearance (ml/min), if applicable Creatinine clearance cannot be calculated (Patient's most recent lab result is older than the maximum 10 days allowed.)   Pregnant (Yes/No/NA) No   Breastfeeding (Yes/No/NA) No   Urgent Care providers Impression and Plan (applicable information)   Start  medication tonight    Lots of fluids    Recheck if feeling more ill such as fever, nausea or pain    Recheck 3 to 4 days if symptoms do not improve   Urgent Care diagnosis UTI   Urgent Care Provider  Waqar Crump MD RN Assessment (Patient s current Symptoms), include time called.  [Insert Left message here if message left]  2:18PM: Spoke with patient. She states she is feeling alittle better, but still having some burning with urination and some urgency. Denies any vomiting or back pain, no new symptoms.     RN Recommendations/Instructions per [facility] lab result protocol  Patient notified of lab result and treatment recommendations.  Rx for Cephalexin (Keflex) 500 mg capsule, 1 capsule (500 mg) by mouth 2 times daily for 7 days sent to [Pharmacy - [facility] in [place]].  RN reviewed information about UTI's.  Patient's allergy to Augmentin is a rash and diarrhea, no breathing difficulty.   Advised to follow up with a PCP as directed by the urgent care provider.     Patient Education on preventing future UTI's.  1. Practice good personal hygiene. Wipe yourself from front to back after using the toilet. This helps keep bacteria from getting into the urethra. Keep the genital area clean and dry.  2. Drink plenty of fluids, such as water, juice, or other caffeine-free drinks.    3. Empty your bladder. Always empty your bladder when you feel the urge to urinate. And always urinate before going to sleep.   4. Follow up with your health care provider as directed. He or she may test to make sure the infection has cleared. If necessary, additional treatment may be started.    The patient is comfortable with the information given and has no further questions.      Please Contact your PCP clinic or return to the Emergency department if your:    Symptoms do not improve after 3 days on antibiotic.    Symptoms worsen or other concerning symptom's.    PCP follow-up Questions asked: YES       [RN Name]  Nohelia  Vamsi RN  [employer] [facility] RN  Lung Nodule and ED Lab Result RN  Epic pool (ED late result f/u RN): [id number]  [facility] INCIDENTAL RADIOLOGY F/U NURSES: P [id number]  # [phone]    Copy of Lab result   Urine Culture Aerobic Bacterial   Order: [id number]   Status:  Final result   Visible to patient:  No (not released) Dx:  Nonspecific finding on examination of...   Specimen Information:  Midstream Urine          Component  4d ago   Specimen Description  Midstream Urine     Special Requests  Specimen received in preservative     Culture Micro  Abnormal     50,000 to 100,000 colonies/mL   Escherichia coli     Resulting Agency  [facility]    Susceptibility      Escherichia coli      KASSIDY      AMPICILLIN  >=32 ug/mL  Resistant      AMPICILLIN/SULBACTAM  16 ug/mL  Intermediate      CEFAZOLIN  <=4 ug/mL  Sensitive1      CEFEPIME  <=1 ug/mL  Sensitive      CEFOXITIN  <=4 ug/mL  Sensitive      CEFTAZIDIME  <=1 ug/mL  Sensitive      CEFTRIAXONE  <=1 ug/mL  Sensitive      CIPROFLOXACIN  0.5 ug/mL  Intermediate      GENTAMICIN  <=1 ug/mL  Sensitive      LEVOFLOXACIN  1 ug/mL  Intermediate      NITROFURANTOIN  <=16 ug/mL  Sensitive      Piperacillin/Tazo  <=4 ug/mL  Sensitive      TOBRAMYCIN  <=1 ug/mL  Sensitive      Trimethoprim/Sulfa  >=16/304 ug/mL  Resistant            1  Cefazolin KASSIDY breakpoints are for the treatment of uncomplicated urinary tract    infections.  For the treatment of systemic infections, please contact the   laboratory for additional testing.             Specimen Collected: 08/20/20  7:38 PM  Last Resulted: 08/24/20  1:56 PM

## 2020-09-30 ENCOUNTER — ALLIED HEALTH/NURSE VISIT (OUTPATIENT)
Dept: FAMILY MEDICINE | Facility: CLINIC | Age: 20
End: 2020-09-30
Payer: COMMERCIAL

## 2020-09-30 DIAGNOSIS — Z23 ENCOUNTER FOR IMMUNIZATION: Primary | ICD-10-CM

## 2020-09-30 PROCEDURE — 90471 IMMUNIZATION ADMIN: CPT

## 2020-09-30 PROCEDURE — 99207 ZZC NO CHARGE NURSE ONLY: CPT

## 2020-09-30 PROCEDURE — 90715 TDAP VACCINE 7 YRS/> IM: CPT

## 2020-09-30 NOTE — NURSING NOTE
Prior to immunization administration, verified patients identity using patient s name and date of birth. Please see Immunization Activity for additional information.     Screening Questionnaire for Adult Immunization    Are you sick today?   No   Do you have allergies to medications, food, a vaccine component or latex?   No   Have you ever had a serious reaction after receiving a vaccination?   No   Do you have a long-term health problem with heart, lung, kidney, or metabolic disease (e.g., diabetes), asthma, a blood disorder, no spleen, complement component deficiency, a cochlear implant, or a spinal fluid leak?  Are you on long-term aspirin therapy?   No   Do you have cancer, leukemia, HIV/AIDS, or any other immune system problem?   No   Do you have a parent, brother, or sister with an immune system problem?   No   In the past 3 months, have you taken medications that affect  your immune system, such as prednisone, other steroids, or anticancer drugs; drugs for the treatment of rheumatoid arthritis, Crohn s disease, or psoriasis; or have you had radiation treatments?   No   Have you had a seizure, or a brain or other nervous system problem?   No   During the past year, have you received a transfusion of blood or blood    products, or been given immune (gamma) globulin or antiviral drug?   No   For women: Are you pregnant or is there a chance you could become       pregnant during the next month?   No   Have you received any vaccinations in the past 4 weeks?   No     Immunization questionnaire answers were all negative.        Per orders of Dr. Becker, injection of TDaP given by Georgette Weiss CMA. Patient instructed to remain in clinic for 15 minutes afterwards, and to report any adverse reaction to me immediately.       Screening performed by Georgette Weiss CMA on 9/30/2020 at 10:46 AM.

## 2021-01-03 ENCOUNTER — HEALTH MAINTENANCE LETTER (OUTPATIENT)
Age: 21
End: 2021-01-03

## 2021-05-24 ENCOUNTER — OFFICE VISIT (OUTPATIENT)
Dept: FAMILY MEDICINE | Facility: CLINIC | Age: 21
End: 2021-05-24
Payer: COMMERCIAL

## 2021-05-24 VITALS
DIASTOLIC BLOOD PRESSURE: 62 MMHG | SYSTOLIC BLOOD PRESSURE: 100 MMHG | WEIGHT: 138 LBS | RESPIRATION RATE: 12 BRPM | HEART RATE: 74 BPM | TEMPERATURE: 98.2 F | OXYGEN SATURATION: 100 % | BODY MASS INDEX: 25.4 KG/M2 | HEIGHT: 62 IN

## 2021-05-24 DIAGNOSIS — Z11.3 SCREEN FOR STD (SEXUALLY TRANSMITTED DISEASE): ICD-10-CM

## 2021-05-24 DIAGNOSIS — F50.20 BULIMIA: ICD-10-CM

## 2021-05-24 DIAGNOSIS — Z00.00 ROUTINE GENERAL MEDICAL EXAMINATION AT A HEALTH CARE FACILITY: Primary | ICD-10-CM

## 2021-05-24 DIAGNOSIS — R30.0 DYSURIA: ICD-10-CM

## 2021-05-24 DIAGNOSIS — F31.9 BIPOLAR AFFECTIVE DISORDER, REMISSION STATUS UNSPECIFIED (H): ICD-10-CM

## 2021-05-24 DIAGNOSIS — Z87.440 PERSONAL HISTORY OF URINARY TRACT INFECTION: ICD-10-CM

## 2021-05-24 DIAGNOSIS — F41.1 GAD (GENERALIZED ANXIETY DISORDER): ICD-10-CM

## 2021-05-24 LAB
ALBUMIN UR-MCNC: NEGATIVE MG/DL
APPEARANCE UR: CLEAR
BILIRUB UR QL STRIP: NEGATIVE
COLOR UR AUTO: YELLOW
GLUCOSE UR STRIP-MCNC: NEGATIVE MG/DL
HGB UR QL STRIP: NEGATIVE
KETONES UR STRIP-MCNC: NEGATIVE MG/DL
LEUKOCYTE ESTERASE UR QL STRIP: NEGATIVE
NITRATE UR QL: NEGATIVE
PH UR STRIP: 6.5 PH (ref 5–7)
SOURCE: NORMAL
SP GR UR STRIP: 1.01 (ref 1–1.03)
SPECIMEN SOURCE: NORMAL
UROBILINOGEN UR STRIP-ACNC: 0.2 EU/DL (ref 0.2–1)
WET PREP SPEC: NORMAL

## 2021-05-24 PROCEDURE — G0145 SCR C/V CYTO,THINLAYER,RESCR: HCPCS | Performed by: PHYSICIAN ASSISTANT

## 2021-05-24 PROCEDURE — 87210 SMEAR WET MOUNT SALINE/INK: CPT | Performed by: PHYSICIAN ASSISTANT

## 2021-05-24 PROCEDURE — 99213 OFFICE O/P EST LOW 20 MIN: CPT | Mod: 25 | Performed by: PHYSICIAN ASSISTANT

## 2021-05-24 PROCEDURE — 81003 URINALYSIS AUTO W/O SCOPE: CPT | Performed by: PHYSICIAN ASSISTANT

## 2021-05-24 PROCEDURE — 87491 CHLMYD TRACH DNA AMP PROBE: CPT | Performed by: PHYSICIAN ASSISTANT

## 2021-05-24 PROCEDURE — 99385 PREV VISIT NEW AGE 18-39: CPT | Performed by: PHYSICIAN ASSISTANT

## 2021-05-24 ASSESSMENT — ENCOUNTER SYMPTOMS
BREAST MASS: 0
HEMATOCHEZIA: 0
EYE PAIN: 0
NAUSEA: 0
CHILLS: 0
WEAKNESS: 0
DIZZINESS: 0
ARTHRALGIAS: 0
DYSURIA: 1
HEADACHES: 1
HEMATURIA: 0
ABDOMINAL PAIN: 0
PALPITATIONS: 0
JOINT SWELLING: 0
SHORTNESS OF BREATH: 0
COUGH: 0
HEARTBURN: 0
DIARRHEA: 0
SORE THROAT: 0
FEVER: 0
CONSTIPATION: 0
PARESTHESIAS: 0
NERVOUS/ANXIOUS: 1
FREQUENCY: 0
MYALGIAS: 0

## 2021-05-24 ASSESSMENT — MIFFLIN-ST. JEOR: SCORE: 1334.21

## 2021-05-24 NOTE — PROGRESS NOTES
SUBJECTIVE:   CC: Marija Langley is an 21 year old woman who presents for preventive health visit.     Patient has been advised of split billing requirements and indicates understanding: Yes  Healthy Habits:     Getting at least 3 servings of Calcium per day:  Yes    Bi-annual eye exam:  Yes    Dental care twice a year:  Yes    Sleep apnea or symptoms of sleep apnea:  None    Diet:  Vegetarian/vegan and Breakfast skipped    Frequency of exercise:  2-3 days/week    Duration of exercise:  30-45 minutes    Taking medications regularly:  Yes    Medication side effects:  None    PHQ-2 Total Score: 0    Additional concerns today:  No    Establish care.  Used to live in [place].      Bipolar affective.  History anorexia and bulimia.  Wanting referral.  Not on meds currently and feels med didn't do well in past.  Has some anxiety currently but feels she's good to wait until gets referrals.  Feels eating disorder doing a lot better.      Wonders if UTI.  Had UTI last yr.  History of ureter surgery which helped but still gets UTI 1-2 x per year.  Some urgency and slight burning.  No vaginal discharge or std concerns.    Independent foster care aid.  Loves it.    Uses condoms.      Today's PHQ-2 Score:   PHQ-2 ( 1999 Pfizer) 5/24/2021   Q1: Little interest or pleasure in doing things 0   Q2: Feeling down, depressed or hopeless 0   PHQ-2 Score 0   Q1: Little interest or pleasure in doing things Not at all   Q2: Feeling down, depressed or hopeless Not at all   PHQ-2 Score 0       Abuse: Current or Past (Physical, Sexual or Emotional) - No  Do you feel safe in your environment? Yes    Have you ever done Advance Care Planning? (For example, a Health Directive, POLST, or a discussion with a medical provider or your loved ones about your wishes): No, advance care planning information given to patient to review.  Patient declined advance care planning discussion at this time.    Social History     Tobacco Use     Smoking status:  Never Smoker     Smokeless tobacco: Never Used   Substance Use Topics     Alcohol use: Never     Frequency: Never     Alcohol Use 5/24/2021   Prescreen: >3 drinks/day or >7 drinks/week? No     Reviewed orders with patient.  Reviewed health maintenance and updated orders accordingly - Yes  Labs reviewed in EPIC  BP Readings from Last 3 Encounters:   05/24/21 100/62   08/20/20 102/60   08/07/18 108/62    Wt Readings from Last 3 Encounters:   05/24/21 62.6 kg (138 lb)   08/07/18 59 kg (130 lb)                    Breast Cancer Screening:        History of abnormal Pap smear: NO - age 21-29 PAP every 3 years recommended     Reviewed and updated as needed this visit by clinical staff  Tobacco  Allergies  Meds  Problems  Med Hx  Surg Hx  Fam Hx  Soc Hx          Reviewed and updated as needed this visit by Provider  Tobacco  Allergies  Meds  Problems  Med Hx  Surg Hx  Fam Hx           Review of Systems   Constitutional: Negative for chills and fever.   HENT: Negative for congestion, ear pain, hearing loss and sore throat.    Eyes: Negative for pain and visual disturbance.   Respiratory: Negative for cough and shortness of breath.    Cardiovascular: Negative for chest pain, palpitations and peripheral edema.   Gastrointestinal: Negative for abdominal pain, constipation, diarrhea, heartburn, hematochezia and nausea.   Breasts:  Negative for tenderness, breast mass and discharge.   Genitourinary: Positive for dysuria. Negative for frequency, genital sores, hematuria, pelvic pain, urgency, vaginal bleeding and vaginal discharge.   Musculoskeletal: Negative for arthralgias, joint swelling and myalgias.   Skin: Negative for rash.   Neurological: Positive for headaches. Negative for dizziness, weakness and paresthesias.   Psychiatric/Behavioral: Positive for mood changes. The patient is nervous/anxious.      OBJECTIVE:   /62 (BP Location: Right arm, Patient Position: Chair, Cuff Size: Adult Regular)   Pulse 74  "  Temp 98.2  F (36.8  C) (Tympanic)   Resp 12   Ht 1.575 m (5' 2\")   Wt 62.6 kg (138 lb)   LMP 04/29/2021   SpO2 100%   BMI 25.24 kg/m    Physical Exam  GENERAL: healthy, alert and no distress  EYES: Eyes grossly normal to inspection, PERRL and conjunctivae and sclerae normal  HENT: ear canals and TM's normal, nose and mouth without ulcers or lesions  NECK: no adenopathy, no asymmetry, masses, or scars and thyroid normal to palpation  RESP: lungs clear to auscultation - no rales, rhonchi or wheezes  CV: regular rate and rhythm, normal S1 S2, no S3 or S4, no murmur, click or rub, no peripheral edema and peripheral pulses strong  ABDOMEN: soft, nontender, no hepatosplenomegaly, no masses and bowel sounds normal   (female): normal female external genitalia, normal urethral meatus, vaginal mucosa pink, moist, well rugated, and normal cervix/adnexa/uterus without masses or discharge  MS: no gross musculoskeletal defects noted, no edema  SKIN: no suspicious lesions or rashes  NEURO: Normal strength and tone, mentation intact and speech normal  PSYCH: mentation appears normal, affect normal/bright    Diagnostic Test Results:  Labs reviewed in Epic    ASSESSMENT/PLAN:       ICD-10-CM    1. Routine general medical examination at a health care facility  Z00.00 PAP imaged thin layer screen only - recommended age 21 - 24 years   2. Bipolar affective disorder, remission status unspecified (H)  F31.9    3. CATRACHITO (generalized anxiety disorder)  F41.1 MENTAL HEALTH REFERRAL  - Adult; Outpatient Treatment, Psychiatry; Individual/Couples/Family/Group Therapy/Health Psychology; Other: [facility] [phone]; We will contact you to schedule the appointment or please call with any question...   4. Bulimia  F50.2    5. Dysuria  R30.0 UA reflex to Microscopic and Culture     Wet prep   6. Screen for STD (sexually transmitted disease)  Z11.3 CHLAMYDIA TRACHOMATIS PCR       Patient has been advised of split billing " "requirements and indicates understanding: Yes  COUNSELING:  Reviewed preventive health counseling, as reflected in patient instructions    Estimated body mass index is 25.24 kg/m  as calculated from the following:    Height as of this encounter: 1.575 m (5' 2\").    Weight as of this encounter: 62.6 kg (138 lb).    She reports that she has never smoked. She has never used smokeless tobacco.      Counseling Resources:  ATP IV Guidelines  Pooled Cohorts Equation Calculator  Breast Cancer Risk Calculator  BRCA-Related Cancer Risk Assessment: FHS-7 Tool  FRAX Risk Assessment  ICSI Preventive Guidelines  Dietary Guidelines for Americans, 2010  Intimate [employer] 2 Conception's MyPlate  ASA Prophylaxis  Lung CA Screening    Cony Bishop PA-C  [facility]    Patient Instructions   Urine test    Recommend considering IUD for birth control if wanting no risk of weight gain     B complex vitamin due to being vegetarian    Will receive call to schedule with counseling and psychiatry     Consider hep A vaccine    Preventive Health Recommendations  Female Ages 21 to 25     Yearly exam:     See your health care provider every year in order to  o Review health changes.   o Discuss preventive care.    o Review your medicines if your doctor has prescribed any.      You should be tested each year for STDs (sexually transmitted diseases).       Talk to your provider about how often you should have cholesterol testing.      Get a Pap test every three years. If you have an abnormal result, your doctor may have you test more often.      If you are at risk for diabetes, you should have a diabetes test (fasting glucose).     Shots:     Get a flu shot each year.     Get a tetanus shot every 10 years.     Consider getting the shot (vaccine) that prevents cervical cancer (Gardasil).    Nutrition:     Eat at least 5 servings of fruits and vegetables each day.    Eat whole-grain bread, whole-wheat pasta and brown rice instead of white " grains and rice.    Get adequate Calcium and Vitamin D.     Lifestyle    Exercise at least 150 minutes a week each week (30 minutes a day, 5 days a week). This will help you control your weight and prevent disease.    Limit alcohol to one drink per day.    No smoking.     Wear sunscreen to prevent skin cancer.    See your dentist every six months for an exam and cleaning.

## 2021-05-24 NOTE — Clinical Note
3/31 first covid vaccine, not showing in KASSIDY, please add to vaccine history.  2nd dose did show on KASSIDY.  Bring over history and prob list from [facility] Spoke to patient via  from [employer]. Explained how we have put her on our short term follow-up list for her PB mammogram result and we will pay for that upcoming visit. Also asked if she wanted to go see a Breast surgeon about her possible cysts and that we could pay for it but patient preferred to wait the 6 months and see what happens then. I told her we would call her in approx 5 months to help set up her follow-up appt. Pt was very appreciative.

## 2021-05-24 NOTE — NURSING NOTE
"Chief Complaint   Patient presents with     Physical       Initial /62 (BP Location: Right arm, Patient Position: Chair, Cuff Size: Adult Regular)   Pulse 74   Temp 98.2  F (36.8  C) (Tympanic)   Resp 12   Ht 1.575 m (5' 2\")   Wt 62.6 kg (138 lb)   LMP 04/29/2021   SpO2 100%   BMI 25.24 kg/m   Estimated body mass index is 25.24 kg/m  as calculated from the following:    Height as of this encounter: 1.575 m (5' 2\").    Weight as of this encounter: 62.6 kg (138 lb).    Patient presents to the clinic using No DME    Health Maintenance that is potentially due pending provider review:  NONE        Is there anyone who you would like to be able to receive your results? No  If yes have patient fill out ANGIE      "

## 2021-05-24 NOTE — PATIENT INSTRUCTIONS
Urine test    Recommend considering IUD for birth control if wanting no risk of weight gain     B complex vitamin due to being vegetarian    Will receive call to schedule with counseling and psychiatry     Consider hep A vaccine    Preventive Health Recommendations  Female Ages 21 to 25     Yearly exam:     See your health care provider every year in order to  o Review health changes.   o Discuss preventive care.    o Review your medicines if your doctor has prescribed any.      You should be tested each year for STDs (sexually transmitted diseases).       Talk to your provider about how often you should have cholesterol testing.      Get a Pap test every three years. If you have an abnormal result, your doctor may have you test more often.      If you are at risk for diabetes, you should have a diabetes test (fasting glucose).     Shots:     Get a flu shot each year.     Get a tetanus shot every 10 years.     Consider getting the shot (vaccine) that prevents cervical cancer (Gardasil).    Nutrition:     Eat at least 5 servings of fruits and vegetables each day.    Eat whole-grain bread, whole-wheat pasta and brown rice instead of white grains and rice.    Get adequate Calcium and Vitamin D.     Lifestyle    Exercise at least 150 minutes a week each week (30 minutes a day, 5 days a week). This will help you control your weight and prevent disease.    Limit alcohol to one drink per day.    No smoking.     Wear sunscreen to prevent skin cancer.    See your dentist every six months for an exam and cleaning.

## 2021-05-25 LAB
C TRACH DNA SPEC QL NAA+PROBE: NEGATIVE
SPECIMEN SOURCE: NORMAL

## 2021-05-26 PROBLEM — F31.9 BIPOLAR AFFECTIVE DISORDER, REMISSION STATUS UNSPECIFIED (H): Status: ACTIVE | Noted: 2021-05-26

## 2021-05-26 PROBLEM — F50.20 BULIMIA: Status: ACTIVE | Noted: 2021-05-26

## 2021-05-26 PROBLEM — Z87.440 PERSONAL HISTORY OF URINARY TRACT INFECTION: Status: ACTIVE | Noted: 2021-05-26

## 2021-05-26 PROBLEM — F41.1 GAD (GENERALIZED ANXIETY DISORDER): Status: ACTIVE | Noted: 2021-05-26

## 2021-05-26 LAB
COPATH REPORT: NORMAL
PAP: NORMAL

## 2021-05-26 NOTE — RESULT ENCOUNTER NOTE
Marija,    Chlamydia screen came back normal.  If urinary symptoms do not resolve, let me know.    Cony

## 2021-07-29 ASSESSMENT — ANXIETY QUESTIONNAIRES
4. TROUBLE RELAXING: NEARLY EVERY DAY
6. BECOMING EASILY ANNOYED OR IRRITABLE: MORE THAN HALF THE DAYS
3. WORRYING TOO MUCH ABOUT DIFFERENT THINGS: NEARLY EVERY DAY
5. BEING SO RESTLESS THAT IT IS HARD TO SIT STILL: MORE THAN HALF THE DAYS
GAD7 TOTAL SCORE: 19
7. FEELING AFRAID AS IF SOMETHING AWFUL MIGHT HAPPEN: NEARLY EVERY DAY
GAD7 TOTAL SCORE: 19
8. IF YOU CHECKED OFF ANY PROBLEMS, HOW DIFFICULT HAVE THESE MADE IT FOR YOU TO DO YOUR WORK, TAKE CARE OF THINGS AT HOME, OR GET ALONG WITH OTHER PEOPLE?: EXTREMELY DIFFICULT
1. FEELING NERVOUS, ANXIOUS, OR ON EDGE: NEARLY EVERY DAY
7. FEELING AFRAID AS IF SOMETHING AWFUL MIGHT HAPPEN: NEARLY EVERY DAY
2. NOT BEING ABLE TO STOP OR CONTROL WORRYING: NEARLY EVERY DAY
GAD7 TOTAL SCORE: 19

## 2021-07-29 ASSESSMENT — PATIENT HEALTH QUESTIONNAIRE - PHQ9
SUM OF ALL RESPONSES TO PHQ QUESTIONS 1-9: 17
10. IF YOU CHECKED OFF ANY PROBLEMS, HOW DIFFICULT HAVE THESE PROBLEMS MADE IT FOR YOU TO DO YOUR WORK, TAKE CARE OF THINGS AT HOME, OR GET ALONG WITH OTHER PEOPLE: VERY DIFFICULT
SUM OF ALL RESPONSES TO PHQ QUESTIONS 1-9: 17

## 2021-07-30 ENCOUNTER — VIRTUAL VISIT (OUTPATIENT)
Dept: PSYCHOLOGY | Facility: CLINIC | Age: 21
End: 2021-07-30
Payer: COMMERCIAL

## 2021-07-30 ENCOUNTER — VIRTUAL VISIT (OUTPATIENT)
Dept: PSYCHIATRY | Facility: CLINIC | Age: 21
End: 2021-07-30
Attending: PHYSICIAN ASSISTANT
Payer: COMMERCIAL

## 2021-07-30 DIAGNOSIS — F31.32 BIPOLAR AFFECTIVE DISORDER, CURRENTLY DEPRESSED, MODERATE (H): ICD-10-CM

## 2021-07-30 DIAGNOSIS — F50.20 BULIMIA: ICD-10-CM

## 2021-07-30 DIAGNOSIS — F31.9 BIPOLAR AFFECTIVE DISORDER, REMISSION STATUS UNSPECIFIED (H): Primary | ICD-10-CM

## 2021-07-30 DIAGNOSIS — Z86.59 HX OF EATING DISORDER: ICD-10-CM

## 2021-07-30 DIAGNOSIS — F41.1 GAD (GENERALIZED ANXIETY DISORDER): ICD-10-CM

## 2021-07-30 DIAGNOSIS — G47.00 INSOMNIA, UNSPECIFIED TYPE: ICD-10-CM

## 2021-07-30 DIAGNOSIS — F19.11 SUBSTANCE ABUSE IN REMISSION (H): ICD-10-CM

## 2021-07-30 DIAGNOSIS — F41.1 GAD (GENERALIZED ANXIETY DISORDER): Primary | ICD-10-CM

## 2021-07-30 PROCEDURE — 90791 PSYCH DIAGNOSTIC EVALUATION: CPT | Mod: 95 | Performed by: PSYCHOLOGIST

## 2021-07-30 PROCEDURE — 99204 OFFICE O/P NEW MOD 45 MIN: CPT | Mod: 95 | Performed by: PSYCHIATRY & NEUROLOGY

## 2021-07-30 RX ORDER — LAMOTRIGINE 25 MG/1
TABLET ORAL
Qty: 42 TABLET | Refills: 0 | Status: SHIPPED | OUTPATIENT
Start: 2021-07-30 | End: 2021-09-21

## 2021-07-30 RX ORDER — GABAPENTIN 100 MG/1
100 CAPSULE ORAL 3 TIMES DAILY PRN
Qty: 90 CAPSULE | Refills: 1 | Status: SHIPPED | OUTPATIENT
Start: 2021-07-30 | End: 2021-09-21

## 2021-07-30 RX ORDER — LAMOTRIGINE 100 MG/1
100 TABLET ORAL DAILY
Qty: 30 TABLET | Refills: 0 | Status: SHIPPED | OUTPATIENT
Start: 2021-08-27 | End: 2021-09-21 | Stop reason: DRUGHIGH

## 2021-07-30 RX ORDER — PROPRANOLOL HYDROCHLORIDE 10 MG/1
5-10 TABLET ORAL 2 TIMES DAILY PRN
Qty: 60 TABLET | Refills: 1 | Status: SHIPPED | OUTPATIENT
Start: 2021-07-30 | End: 2021-09-21 | Stop reason: DRUGHIGH

## 2021-07-30 RX ORDER — HYDROXYZINE HYDROCHLORIDE 10 MG/1
10-30 TABLET, FILM COATED ORAL
Qty: 90 TABLET | Refills: 1 | Status: SHIPPED | OUTPATIENT
Start: 2021-07-30 | End: 2021-11-04 | Stop reason: ALTCHOICE

## 2021-07-30 ASSESSMENT — COLUMBIA-SUICIDE SEVERITY RATING SCALE - C-SSRS
3. HAVE YOU BEEN THINKING ABOUT HOW YOU MIGHT KILL YOURSELF?: YES
5. HAVE YOU STARTED TO WORK OUT OR WORKED OUT THE DETAILS OF HOW TO KILL YOURSELF? DO YOU INTEND TO CARRY OUT THIS PLAN?: YES
TOTAL  NUMBER OF ABORTED OR SELF INTERRUPTED ATTEMPTS PAST 3 MONTHS: NO
6. HAVE YOU EVER DONE ANYTHING, STARTED TO DO ANYTHING, OR PREPARED TO DO ANYTHING TO END YOUR LIFE?: NO
2. HAVE YOU ACTUALLY HAD ANY THOUGHTS OF KILLING YOURSELF LIFETIME?: YES
TOTAL  NUMBER OF ABORTED OR SELF INTERRUPTED ATTEMPTS PAST LIFETIME: NO
6. HAVE YOU EVER DONE ANYTHING, STARTED TO DO ANYTHING, OR PREPARED TO DO ANYTHING TO END YOUR LIFE?: NO
TOTAL  NUMBER OF ACTUAL ATTEMPTS LIFETIME: 1
4. HAVE YOU HAD THESE THOUGHTS AND HAD SOME INTENTION OF ACTING ON THEM?: NO
2. HAVE YOU ACTUALLY HAD ANY THOUGHTS OF KILLING YOURSELF?: NO
1. IN THE PAST MONTH, HAVE YOU WISHED YOU WERE DEAD OR WISHED YOU COULD GO TO SLEEP AND NOT WAKE UP?: YES
ATTEMPT LIFETIME: YES
5. HAVE YOU STARTED TO WORK OUT OR WORKED OUT THE DETAILS OF HOW TO KILL YOURSELF? DO YOU INTEND TO CARRY OUT THIS PLAN?: NO
ATTEMPT PAST THREE MONTHS: NO
TOTAL  NUMBER OF INTERRUPTED ATTEMPTS PAST 3 MONTHS: NO
TOTAL  NUMBER OF INTERRUPTED ATTEMPTS LIFETIME: NO
1. IN THE PAST MONTH, HAVE YOU WISHED YOU WERE DEAD OR WISHED YOU COULD GO TO SLEEP AND NOT WAKE UP?: NO
4. HAVE YOU HAD THESE THOUGHTS AND HAD SOME INTENTION OF ACTING ON THEM?: YES

## 2021-07-30 ASSESSMENT — ANXIETY QUESTIONNAIRES: GAD7 TOTAL SCORE: 19

## 2021-07-30 ASSESSMENT — PATIENT HEALTH QUESTIONNAIRE - PHQ9: SUM OF ALL RESPONSES TO PHQ QUESTIONS 1-9: 17

## 2021-07-30 NOTE — PROGRESS NOTES
"[facility] Psychiatry Service  Provider Name:  Lakhwinder Chung     Credentials:  HANNAH Weber    PATIENT'S NAME: Marija Langley  PREFERRED NAME: Marija  PRONOUNS: she/her/hers     MRN: [id number]  : 2000  ADDRESS: [address] [place] [id number]  ACCT. NUMBER:  [id number]  DATE OF SERVICE: 21  START TIME: 08:30am  END TIME: 09:10am  PREFERRED PHONE: [phone]  May we leave a program related message: Yes  SERVICE MODALITY:  Video Visit:      Provider verified identity through the following two step process.  Patient provided:  Patient     Telemedicine Visit: The patient's condition can be safely assessed and treated via synchronous audio and visual telemedicine encounter.      Reason for Telemedicine Visit: Services only offered telehealth    Originating Site (Patient Location): Patient's home    Distant Site (Provider Location): Provider Remote Setting- Home Office    Consent:  The patient/guardian has verbally consented to: the potential risks and benefits of telemedicine (video visit) versus in person care; bill my insurance or make self-payment for services provided; and responsibility for payment of non-covered services.     Patient would like the video invitation sent by:  My Chart    Mode of Communication:  Video Conference via Epuramat    As the provider I attest to compliance with applicable laws and regulations related to telemedicine.    UNIVERSAL ADULT Mental Health DIAGNOSTIC ASSESSMENT    Identifying Information:  Patient is a 21 year old,  .  The pronoun use throughout this assessment reflects the patient's chosen pronoun.  Patient was referred for an assessment by  self.  Patient attended the session alone.     Chief Complaint:   The reason for seeking services at this time is: \"Anxiety, mood, and sleep\". Has been on medications, antidepressants (did not work well) and mood stabilizers worked better since she was 15 yo. They did not work well. When she " "moved from [place] to the [facility] she has needed to get back on medications; no medications in 3-4 years. Anxiety, social anxiety in particular, has been more difficult. Sleep has been poor the last few months. Sister  by suicide early this month. They were very close growing up; she had a drug history. Main support system, fiancee, parents, step father, 2 younger brothers, and 2 best friends. The problem(s) began 18.    Patient has attempted to resolve these concerns in the past through medication, psychotherapy, inpatient hospitalization, residential treatment, and day treatment programs.    Social/Family History:  Patient reported they grew up in other [place], [place].  They were raised by biological parents;stepfather  .  Parents seperated / .  Patient reported that their childhood was Childhood was \"different.\" Parents  when an infant; remarried but  step-father around age 6. Lived with mom and spend weekends with dad. 2 younger half-brothers. 1 older sister. Dad was not around very much. Mom was not around either; over-prescribed medications and drank a lot. \"Had to raise myself.\" Patient described their current relationships with family of origin as \"much better now.\"      The patient describes their cultural background as . Cultural influences and impact on patient's life structure, values, norms, and healthcare: No cultural or Restorationist discrimination..  Contextual influences on patient's health include: Health- Seeking Factors  .    These factors will be addressed in the Preliminary Treatment plan. Patient identified their preferred language to be English. Patient reported they does not need the assistance of an  or other support involved in therapy.     Patient reported had no significant delays in developmental tasks.   Patient's highest education level was some college  .  Patient identified the following learning problems: none reported.  Modifications " will not be used to assist communication in therapy. Patient reports they are  able to understand written materials.    Patient reported the following relationship history 1 relationship; engaged.  Patient's current relationship status is partner or significant other for 4.5 years.   Patient identified their sexual orientation as heterosexual.  Patient reported having  no child(brit). Patient identified partner;parents;siblings;pets;friends as part of their support system.  Patient identified the quality of these relationships as good,  .      Patient's current living/housing situation involves staying in own home/apartment.  They live with Shari Rock,Significant Other and they report that housing is stable     Patient is currently employed fulltime.  Patient reports their finances are obtained through employment;partner. 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    Patient does identify finances as a current stressor.      Patient reported that they have not been involved with the legal system.   . Patient does not report being under probation/ parole/ jurisdiction. They are not under any current court jurisdiction. .      Patient's Strengths and Limitations:  Patient identified the following strengths or resources that will help them succeed in treatment: commitment to health and well being, friends / good social support, family support, insight, intelligence, positive work environment, motivation, sense of humor, strong social skills and work ethic. Things that may interfere with the patient's success in treatment include: recent death by suicide of sister.     Personal and Family Medical History:  Patient does report a family history of mental health concerns.  Patient reports family history includes No Known Problems in her brother, brother, father, mother, and sister..     Patient does report Mental Health Diagnosis and/or Treatment.  Patient Patient reported the following  previous diagnoses which include(s): an Anxiety Disorder, a Bipolar Disorder and an Eating Disorder.  Patient reported symptoms began in adolescence.   Patient has received mental health services in the past: therapy with  , day treatment with  , inpatient mental health services at  , psychiatry with  .  and partial hospitalization program with  .  Psychiatric Hospitalizations:  .  Patient denies a history of civil commitment.  Currently, patient is not receiving other mental health services.  These include none.     Hx of anorexia, bulimia age [age]. Did [facility]. No current therapist. Previous psychiatrist said bipolar and PTSD from the childhood living situation and mom's ex-bf. [facility] program age 14. [facility] - 3 month residential age 16 for eating disorder and alcohol abuse. 2016 - overdose on pills; shrugged it off as an accident. Hx of self-harm cutting but not now.    Patient has had a physical exam to rule out medical causes for current symptoms.  Date of last physical exam was within the past year. Client was encouraged to follow up with PCP if symptoms were to develop. The patient has a [facility] Provider, who is named Cony Bishop.  Patient reports no current medical concerns.  Patient denies any issues with pain..   There are not significant appetite / nutritional concerns / weight changes.   Patient does not report a history of head injury / trauma / cognitive impairment.    Patient reports current meds as:   No outpatient medications have been marked as taking for the 7/30/21 encounter (Virtual Visit) with Jerry Chung PsyD.       Medication Adherence:  Patient reports not currently prescribed. n/a.    Patient Allergies:    Allergies   Allergen Reactions     Augmentin      Nitrofurantoin Nausea and Vomiting and GI Disturbance     Sulfa Drugs        Medical History:    Past Medical History:   Diagnosis Date     Anorexia nervosa      Anxiety      Bulimia nervosa       Depressive disorder          Current Mental Status Exam:   Appearance:  Appropriate    Eye Contact:  Good   Psychomotor:  Normal       Gait / station:  no problem  Attitude / Demeanor: Cooperative   Speech      Rate / Production: Normal/ Responsive      Volume:  Normal  volume      Language:  intact and no problems  Mood:   Anxious  Depressed   Affect:   Appropriate    Thought Content: Clear   Thought Process: Goal Directed  Logical       Associations: No loosening of associations  Insight:   Good   Judgment:  Intact   Orientation:  All  Attention/concentration: Good    Rating Scales:    PHQ9:    PHQ-9 SCORE 7/29/2021   PHQ-9 Total Score MyChart 17 (Moderately severe depression)   PHQ-9 Total Score 17       GAD7:    CATRACHITO-7 SCORE 7/29/2021   Total Score 19 (severe anxiety)   Total Score 19     CGI:     First:Considering your total clinical experience with this particular patient population, how severe are the patient's symptoms at this time?: 4 (7/30/2021 11:59 AM)      Most recentNo data recorded    Substance Use:  Patient did report a family history of substance use concerns; see medical history section for details.  Patient has received chemical dependency treatment in the past at [facility].  Patient has ever been to detox.      Patient is not currently receiving any chemical dependency treatment.           Substance History of use Age of first use Date of last use     Pattern and duration of use (include amounts and frequency)   Alcohol used in the past   12 08/01/16 REPORTS SUBSTANCE USE: N/A   Cannabis   used in the past 12 07/01/16 REPORTS SUBSTANCE USE: N/A     Amphetamines   never used     REPORTS SUBSTANCE USE: N/A   Cocaine/crack    never used       REPORTS SUBSTANCE USE: N/A   Hallucinogens never used         REPORTS SUBSTANCE USE: N/A   Inhalants never used         REPORTS SUBSTANCE USE: N/A   Heroin never used         REPORTS SUBSTANCE USE: N/A   Other Opiates never used     REPORTS SUBSTANCE USE: N/A    Benzodiazepine   never used     REPORTS SUBSTANCE USE: N/A   Barbiturates never used     REPORTS SUBSTANCE USE: N/A   Over the counter meds never used     REPORTS SUBSTANCE USE: N/A   Caffeine currently use 10   REPORTS SUBSTANCE USE: reports using substance several times per day and has 60-80oz at least per day   at a time.   Patient reports heaviest use is current use.   Nicotine  used in the past 14 08/01/19 REPORTS SUBSTANCE USE: N/A   Other substances not listed above:  Identify:  never used     REPORTS SUBSTANCE USE: N/A     Patient reported the following problems as a result of their substance use: no problems, not applicable.     CAGE- AID:    CAGE-AID Total Score 7/29/2021   Total Score 0   Total Score MyChart 0 (A total score of 2 or greater is considered clinically significant)       Substance Use: No symptoms    Based on the negative CAGE score and clinical interview there  are not indications of drug or alcohol abuse.    Significant Losses / Trauma / Abuse / Neglect Issues:   Patient did not  serve in the .  There are indications or report of significant loss, trauma, abuse or neglect issues related to: death of sister by suicide a few weeks ago.  Concerns for possible neglect are not present.    Safety Assessment:   Current Safety Concerns:  [place] Suicide Severity Rating Scale (Lifetime/Recent)  [place] Suicide Severity Rating (Lifetime/Recent) 7/30/2021   1. Wish to be Dead (Lifetime) Yes   1. Wish to be Dead (Recent) No   2. Non-Specific Active Suicidal Thoughts (Lifetime) Yes   2. Non-Specific Active Suicidal Thoughts (Recent) No   3. Active Suicidal Ideation with any Methods (Not Plan) Without Intent to Act (Lifetime) Yes   3. Active Suicidal Ideation with any Methods (Not Plan) Without Intent to Act (Recent) No   4. Active Suicidal Ideation with Some Intent to Act, Without Specific Plan (Lifetime) Yes   4. Active Suicidal Ideation with Some Intent to Act, Without Specific Plan  (Recent) No   5. Active Suicidal Ideation with Specific Plan and Intent (Lifetime) Yes   5. Active Suicidal Ideation with Specific Plan and Intent (Recent) No   Most Severe Ideation Rating (Lifetime) 4   Controllability (Lifetime) 5   Protective Factors  (Lifetime) 5   Most Severe Ideation Rating (Past Month) NA   Actual Attempt (Lifetime) Yes   Actual Attempt Description (Lifetime) as a teenager; 1 overdose   Total Number of Actual Attempts (Lifetime) 1   Actual Attempt (Past 3 Months) No   Has subject engaged in non-suicidal self-injurious behavior? (Lifetime) Yes   Has subject engaged in non-suicidal self-injurious behavior? (Past 3 Months) No   Interrupted Attempts (Lifetime) No   Interrupted Attempts (Past 3 Months) No   Aborted or Self-Interrupted Attempt (Lifetime) No   Aborted or Self-Interrupted Attempt (Past 3 Months) No   Preparatory Acts or Behavior (Lifetime) No   Preparatory Acts or Behavior (Past 3 Months) No     Patient denies current homicidal ideation and behaviors.  Patient denies current self-injurious ideation and behaviors.    Patient denied risk behaviors associated with substance use.  Patient reported impulsive/compulsive spending behaviors associated with mental health symptoms.  Patient reports the following current concerns for their personal safety: None.  Patient reports there are not firearms in the house.        .    History of Safety Concerns:  Patient denied a history of homicidal ideation.     Patient denied a history of personal safety concerns.    Patient denied a history of assaultive behaviors.    Patient denied a history of sexual assault behaviors.     Patient reported a history of placing themselves in unsafe environment(s) associated with substance use.  Patient reported a history of impulsive/compulsive spending behaviors reported a history of impulsive decision making associated with mental health symptoms.  Patient reports the following protective factors: forward or  "future oriented thinking;dedication to family or friends;safe and stable environment;living with other people;daily obligations;structured day;effective problem solving skills;sense of personal control or determination;access to a variety of clinical interventions and pets    Risk Plan:  See Recommendations for Safety and Risk Management Plan    Review of Symptoms per patient report:  Depression: Change in sleep, Lack of interest, Excessive or inappropriate guilt, Change in energy level, Difficulties concentrating, Change in appetite, Feelings of helplessness, Irritability, Feeling sad, down, or depressed, Withdrawn and Frequent crying  Leanna:  Elevated mood, Irritability, Racing thoughts, Increased activity, Decreased need for sleep, Pressured speech, Restlessness and Impulsiveness with spending \"which is not like me.\" Work 90 hours, clean all the time, schedule many appointments at once. Last time was about 1-2 months ago  Psychosis: No Symptoms  Anxiety: Excessive worry, Nervousness, Physical complaints, such as headaches, stomachaches, muscle tension, Sleep disturbance, Poor concentration and Irritability  Panic:  Palpitations, Tremors, Shortness of breath, Tingling, Numbness and Sense of impending doom  Post Traumatic Stress Disorder:  No Symptoms   Eating Disorder: No Symptoms and generally no; occasionally will restrict meals here and there. no more purging or laxative abuse  ADD / ADHD:  No symptoms  Conduct Disorder: No symptoms  Autism Spectrum Disorder: No symptoms  Obsessive Compulsive Disorder: No Symptoms    Patient reports the following compulsive behaviors and treatment history:  .      Sleep: not good since sister's death. Fall asleep 1am and up every hour.  Alcohol: not often; once a month socially.  Drugs:   Nicotine: 1 ppd from [age]  Caffeine: \"a lot\"; mostly coffee 60-80oz a day    Diagnostic Criteria:   A. Excessive anxiety and worry about a number of events or activities (such as work or " school performance).   B. The person finds it difficult to control the worry.  C. Select 3 or more symptoms (required for diagnosis). Only one item is required in children.   - Restlessness or feeling keyed up or on edge.    - Being easily fatigued.    - Difficulty concentrating or mind going blank.    - Irritability.    - Muscle tension.    - Sleep disturbance (difficulty falling or staying asleep, or restless unsatisfying sleep).   D. The focus of the anxiety and worry is not confined to features of an Axis I disorder.  E. The anxiety, worry, or physical symptoms cause clinically significant distress or impairment in social, occupational, or other important areas of functioning.   F. The disturbance is not due to the direct physiological effects of a substance (e.g., a drug of abuse, a medication) or a general medical condition (e.g., hyperthyroidism) and does not occur exclusively during a Mood Disorder, a Psychotic Disorder, or a Pervasive Developmental Disorder.  Bipolar 1; current depressive episode    Functional Status:  Patient reports the following functional impairments: chronic disease management, home life with  , management of the household and or completion of tasks, relationship(s) and self-care.     WHODAS:   WHODAS 2.0 Total Score 2021   Total Score 33   Total Score MyChart 33     Nonprogrammatic care:  Patient is requesting basic services to address current mental health concerns.    Clinical Summary:  1. Reason for assessment: medication evaluation   .  2. Psychosocial, Cultural and Contextual Factors: sister recently  by suicide  .  3. Principal DSM5 Diagnoses  (Sustained by DSM5 Criteria Listed Above):   296.52 Bipolar I Disorder Current or Most Recent Episode Depressed, Moderate  300.23 (F40.10) Social Anxiety Disorder  300.02 (F41.1) Generalized Anxiety Disorder.  4. Other Diagnoses that is relevant to services:   .  5. Provisional Diagnosis:   as evidenced by  .  6. Prognosis: Expect  Improvement and Relieve Acute Symptoms.  7. Likely consequences of symptoms if not treated: deterioration of functioning.  8. Client strengths include:  caring, educated, empathetic, employed, goal-focused, good listener, has a previous history of therapy, insightful, intelligent, motivated, open to learning, open to suggestions / feedback, support of family, friends and providers, supportive, wants to learn, willing to ask questions, willing to relate to others and work history .     Recommendations:     1. Plan for Safety and Risk Management:   A safety and risk management plan has been developed including: Patient consented to co-developed safety plan.  Safety and risk management plan was completed.  Patient agreed to use safety plan should any safety concerns arise.  A copy was given to the patient..          Report to child / adult protection services was NA.     2. Patient's identified  .     3. Initial Treatment will focus on:    Depressed Mood -    Anxiety -  .     4. Resources/Service Plan:    services are not indicated.   Modifications to assist communication are not indicated.   Additional disability accommodations are not indicated.      5. Collaboration:   Collaboration / coordination of treatment will be initiated with the following  support professionals: psychiatry.      6.  Referrals:   The following referral(s) will be initiated: Will discuss therapy referral at follow up. Next Scheduled Appointment: .     A Release of Information has been obtained for the following: n/a.    7. TERENCE:    TERENCE:  Discussed the general effects of drugs and alcohol on health and well-being. Provider gave patient printed information about the effects of chemical use on their health and well being. Recommendations:  maintain sobriety .     8. Records:   These were reviewed at time of assessment.   Information in this assessment was obtained from the medical record and  provided by patient who is a good historian.  "   Patient will have open access to their mental health medical record.      Provider Name/ Credentials:  Lakhwinder Chung PsyD, LP  July 30, 2021      [facility]                                      Patient's Name: Marija Langley  July 30, 2021    SAFETY PLAN:  Step 1: Warning signs / cues (Thoughts, images, mood, situation, behavior) that a crisis may be developing:    Thoughts: \"I don't matter\", \"People would be better off without me\", \"I can't do this anymore\" and \"I just want this to end\"    Images: obsessive thoughts of death or dying:   and visions of harm:      Thinking Processes: ruminations (can't stop thinking about my problems):  , racing thoughts, intrusive thoughts (bothersome, unwanted thoughts that come out of nowhere):   and highly critical and negative thoughts:      Mood: worsening depression, intense anger, intense worry, agitation, disinhibited (not caring about things or consequences) and mood swings    Behaviors: isolating/withdrawing , can't stop crying, impulsive, reckless behaviors (acting without thinking):  , not taking care of myself, not taking care of my responsibilities and not sleeping enough    Situations: relationship problems and financial stress   Step 2: Coping strategies - Things I can do to take my mind off of my problems without contacting another person (relaxation technique, physical activity):    Distress Tolerance Strategies:  relaxation activities:  , listen to positive and upbeat music:   and sensory based activities/self-soothe with five senses:      Physical Activities: go for a walk, yoga, meditation, deep breathing and stretching     Focus on helpful thoughts:  \"This is temporary\", \"I will get through this\" and remind myself of what is important to me:    Step 3: People and social settings that provide distraction:   Name: Pranav Phone: In my phone   Name: Mom Phone: in my phone   Name: Friend Phone: in my phone   Step 4: Remind myself of people " and things that are important to me and worth living for:  Fiancee, family, friends  Step 5: When I am in crisis, I can ask these people to help me use my safety plan:   Name: Pranav Phone: In my phone   Name: Mom Phone: in my phone   Name: Friend Phone: in my phone   Step 6: Making the environment safe:     dispose of old medications , remove things I could use to hurt myself:   and be around others  Step 7: Professionals or agencies I can contact during a crisis:    Suicide Prevention Lifeline: 4-327-661-AGUX (1969)    Crisis Text Line Service: Text   [place]  to 667569.    Local Crisis Services:   [facility]  [address] [place] [address] [phone]    Call 911 or go to my nearest emergency department.   I helped develop this safety plan and agree to use it when needed.  I have been given a copy of this plan.      Patient signature: _______________________________________________________________  Today s date:  July 30, 2021  Adapted from Safety Plan Template 2008 Dot Bojorquez and Rj Geogres is reprinted with the express permission of the authors.  No portion of the Safety Plan Template may be reproduced without the express, written permission.  You can contact the authors at [email or web address] or [email or web address]

## 2021-07-30 NOTE — PATIENT INSTRUCTIONS
Treatment Plan:     Start Lamotrigine: take 25 mg daily for two weeks then increase to 50 mg daily for two weeks then increase to 100 mg until follow-up appointment in about six weeks.      Start propranolol 5-10 mg twice daily as needed for social anxiety and/or physical symptoms of anxiety. Discontinue if you feel too lightheaded or dizzy.     Start gabapentin 100 mg three times daily as needed for anxiety symptoms.     Start hydroxyzine 10-30 mg at bedtime as needed for sleep.      Continue therapy as planned.    Continue all other cares per primary care provider.     Continue all other medications as reviewed per electronic medical record today.     Safety plan reviewed. To the Emergency Department as needed or call after hours crisis line at [phone] or [phone]. [place] Crisis Text Line: Text [place] to 169042  or  Suicide LifeLine Chat: suicideprevention[facility] Healthline.org/chat    Schedule an appointment with me in 6 weeks or sooner as needed.  Call [facility] at [phone] to schedule.    Follow up with primary care provider as planned or sooner if needed for acute medical concerns.    Call the psychiatric nurse line with medication questions or concerns at [phone].    Referlyhart may be used to communicate with your provider, but this is not intended to be used for emergencies.    Patient Education:  Discussed Lamictal (lamotrigine) can cause serious rashes including Gómez-Nima syndrome which may requiring hospitalization and discontinuation of treatment. If any signs of a rash occur, please see your Primary Care Provider or a dermatologist immediately.     Community Resources:    National Suicide Prevention Lifeline: 901.794.2307 (TTY: 485.745.9000). Call anytime for help.  (www.suicidepreventionlifeline.org)  National [place] [employer] Mental Illness (www.[place].org): 928.654.1202 or 131-552-0722.   Mental Health Association (www.mentalhealth.org): 197.604.8098 or 741-254-0340.  [place]  Crisis Text Line: Text [place] to 671037  Suicide LifeLine Chat: suicidepreventionlifeline.org/chat

## 2021-07-30 NOTE — PATIENT INSTRUCTIONS
"  [facility]                                      Patient's Name: Marija Langley  July 30, 2021    SAFETY PLAN:  Step 1: Warning signs / cues (Thoughts, images, mood, situation, behavior) that a crisis may be developing:    Thoughts: \"I don't matter\", \"People would be better off without me\", \"I can't do this anymore\" and \"I just want this to end\"    Images: obsessive thoughts of death or dying:   and visions of harm:      Thinking Processes: ruminations (can't stop thinking about my problems):  , racing thoughts, intrusive thoughts (bothersome, unwanted thoughts that come out of nowhere):   and highly critical and negative thoughts:      Mood: worsening depression, intense anger, intense worry, agitation, disinhibited (not caring about things or consequences) and mood swings    Behaviors: isolating/withdrawing , can't stop crying, impulsive, reckless behaviors (acting without thinking):  , not taking care of myself, not taking care of my responsibilities and not sleeping enough    Situations: relationship problems and financial stress   Step 2: Coping strategies - Things I can do to take my mind off of my problems without contacting another person (relaxation technique, physical activity):    Distress Tolerance Strategies:  relaxation activities:  , listen to positive and upbeat music:   and sensory based activities/self-soothe with five senses:      Physical Activities: go for a walk, yoga, meditation, deep breathing and stretching     Focus on helpful thoughts:  \"This is temporary\", \"I will get through this\" and remind myself of what is important to me:    Step 3: People and social settings that provide distraction:   Name: Pranav Phone: In my phone   Name: Mom Phone: in my phone   Name: Friend Phone: in my phone   Step 4: Remind myself of people and things that are important to me and worth living for:  Fiancee, family, friends  Step 5: When I am in crisis, I can ask these people to help " me use my safety plan:   Name: Pranav Phone: In my phone   Name: Mom Phone: in my phone   Name: Friend Phone: in my phone   Step 6: Making the environment safe:     dispose of old medications , remove things I could use to hurt myself:   and be around others  Step 7: Professionals or agencies I can contact during a crisis:    Suicide Prevention Lifeline: 1-386-589-TALK (1867)    Crisis Text Line Service: Text   [place]  to 680059.    Local Crisis Services:   [facility]  [address] [place] [address] [phone]    Call 911 or go to my nearest emergency department.   I helped develop this safety plan and agree to use it when needed.  I have been given a copy of this plan.      Patient signature: _______________________________________________________________  Today s date:  July 30, 2021  Adapted from Safety Plan Template 2008 Dot Bojorquez and Rj Georges is reprinted with the express permission of the authors.  No portion of the Safety Plan Template may be reproduced without the express, written permission.  You can contact the authors at [email or web address] or [email or web address]

## 2021-07-30 NOTE — PROGRESS NOTES
"Marija Langley is a 21 year old year old who is being evaluated via a billable video visit.      How would you like to obtain your AVS? MyChart  If you are dropped from the video visit, the video invite should be resent to: Text to cell phone: see Epic  Will anyone else be joining your video visit? No       Telemedicine Visit: The patient's condition can be safely assessed and treated via synchronous audio and visual telemedicine encounter.      Reason for Telemedicine Visit: Covid-19 Pandemic    Originating Site (Patient Location): Patient's home     Distant Site (Provider Location): Provider Remote Setting    Mode of Communication:  Video Conference via Gather App    As the provider I attest to compliance with applicable laws and regulations related to telemedicine.                                                             Outpatient Psychiatric Evaluation- Standard  Adult    Name:  Marija Langley  : 2000    Source of Referral:  Primary Care Provider: Cony Bishop PA-C   Current Psychotherapist: None     Identifying Data:  Patient is a 21 year old, partnered / significant other  White Not  or  female  who presents for initial visit with me.  Patient is currently a student. Patient attended the phone/video session alone. Patient prefers to be called: \"Marija\"    Chief Complaint:  Patient presents with:  Consult      HPI:  Marija Langley is a 21 year old female with past history including eating disorder, PTSD, bipolar disorder, anxiety, and substance abuse-in remission who presents today for psychiatric assessment.     Patient with mental health history dating back several years.  Struggled much more significantly with her mental health symptoms about 5-6 years ago.  Struggled quite a bit at that time with an eating disorder, trauma related symptoms, and substance abuse.  Also later diagnosed with bipolar disorder. Think she started psychiatric medications around the age of 11 " "years old.  Has a history of multiple failed trials with antidepressants.  Then was trialed on mood stabilizers which were much more helpful for her symptoms.  Lamotrigine seem to have been the most helpful medication she has been on and also best tolerated.  She reports she missed a few doses and never ended up restarting the medication.  Thinks she was last on lamotrigine about 5 years ago.  No rashes from lamotrigine.    Patient incredibly resilient and has been doing quite well despite being off medications for the past 4-5 years.  Unfortunately she has had some increase psychosocial stressors recently.  Patient lost her sister to suicide a couple weeks ago.  She was close growing up with her sister and had recently been reconnecting over the past year.  Patient's partner is very supportive.  They have been together for about 5 years.  They will hopefully get  within the next few years when school, work, and COVID-19 calm down.  Patient reports last feeling hypomanic around the end of .  Her symptoms escalated right before a trip to [place].  She had worked a 90-hour week prior to leaving for [place].  She felt pretty hypomanic for couple weeks and more recently things have \"taken a big down point.\"  She is hoping to get stabilized on medication again.  Denies suicidal ideation.  No drug or alcohol abuse.    Per [facility], Dr. Lakhwinder Chung, during today's team-based visit:  The reason for seeking services at this time is: \"Anxiety, mood, and sleep\". Has been on medications, antidepressants (did not work well) and mood stabilizers worked better since she was 15 yo. They did not work well. When she moved from [place] to the [facility] she has needed to get back on medications; no medications in 3-4 years. Anxiety, social anxiety in particular, has been more difficult. Sleep has been poor the last few months. Sister  by suicide early this month. They were very close growing up; she had a drug history. Main " "support system, fiancee, parents, step father, 2 younger brothers, and 2 best friends. The problem(s) began 03/01/20.     Patient has attempted to resolve these concerns in the past through medication, psychotherapy, inpatient hospitalization, residential treatment, and day treatment programs.     Per primary care provider visit 2/13/20:  Marija Langley is a 19 year old female who is seen today for worsening anxiety. It started to worsen about a few months ago, she does not recall an isolated event. Low mood, difficulty concentration, constant worrying. Difficulty falling asleep and staying asleep. Tired all the time.     Patient is living in [place] and is here today because she has social anxiety and difficulty with establishing with new providers. Acknowledges that therapy would be helpful, but she doesn't want to establish with a new therapist.     She states overall her health is much better than when she was seen 2 years ago. She no longer is using laxatives or purging. Restricting food much less. States her weight has been stable. 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    Living with her boyfriend of 3 years and they recently bought a house together. Notes boyfriend has a hx of alcohol abuse and a few months ago \"fell off the wagon.\" Now sober 1.5 months, but not attending any counseling or treatment. She notes this may have set off her worsening moods.     Denies use of alcohol, marijuana, or other illicit substances.    Tried many meds in the past, now thinks Lamictal is the only medication that helped stabilized moods.      Past diagnoses include: Eating disorder, PTSD, polysubstance abuse-in remission, bipolar II disorder  Current medications include: has a current medication list which includes the following prescription(s): acetaminophen.   Medication side effects: Not on psychiatric medication  Current stressors include: Symptoms and See HPI above  Coping mechanisms and " "supports include: Family, Hobbies and Friends    Psychiatric Review of Symptoms Per [facility], Dr. Lakhwinder Chung, during today's team-based visit:  Depression:     Change in sleep, Lack of interest, Excessive or inappropriate guilt, Change in energy level, Difficulties concentrating, Change in appetite, Feelings of helplessness, Irritability, Feeling sad, down, or depressed, Withdrawn and Frequent crying  Leanna:             Elevated mood, Irritability, Racing thoughts, Increased activity, Decreased need for sleep, Pressured speech, Restlessness and Impulsiveness with spending \"which is not like me.\" Work 90 hours, clean all the time, schedule many appointments at once. Last time was about 1-2 months ago  Psychosis:       No Symptoms  Anxiety:           Excessive worry, Nervousness, Physical complaints, such as headaches, stomachaches, muscle tension, Sleep disturbance, Poor concentration and Irritability  Panic:              Palpitations, Tremors, Shortness of breath, Tingling, Numbness and Sense of impending doom  Post Traumatic Stress Disorder:  No Symptoms   Eating Disorder:          No Symptoms and generally no; occasionally will restrict meals here and there. no more purging or laxative abuse  ADD / ADHD:              No symptoms  Conduct Disorder:       No symptoms  Autism Spectrum Disorder:     No symptoms  Obsessive Compulsive Disorder:       No Symptoms    All other ROS negative.     PHQ-9 scores:   PHQ-9 SCORE 7/29/2021   PHQ-9 Total Score MyChart 17 (Moderately severe depression)   PHQ-9 Total Score 17       CATRACHITO-7 scores:    CATRACHITO-7 SCORE 7/29/2021   Total Score 19 (severe anxiety)   Total Score 19       Medical Review of Systems:  10 systems (general, cardiovascular, respiratory, eyes, ENT, endocrine, GI, , M/S, neurological) were reviewed. Most pertinent finding(s) is/are: denies fever, cough, headaches, shortness of breath, chest pain, N/V, constipation/diarrhea, trouble urinating, aches and pains. The remaining " "systems are all unremarkable.    A 12-item WHODAS 2.0 assessment was not completed.    Psychiatric History:   Hospitalizations: Inpatient eating disorder treatment at [facility] around 2015  History of Commitment? No   Past Treatment: counseling, inpatient mental health services, medication(s) from physician / PCP and psychiatry; outpatient eating disorder treatment at [facility]; inpatient eating disorder treatment at [facility]  Suicide Attempts: Yes Overdose attempt as a teenager   Current Suicide Risk: Suicide Assessment Completed Today.  Self-injurious Behavior: History of self-harm behaviors in high school but none for several years  Electroconvulsive Therapy (ECT) or Transcranial Magnetic Stimulation (TMS): No   GeneSight Genetic Testing: No     Past medication trials include but are not limited to:   Prozac/fluoxetine-in a \"rage\" on Prozac. Was throwing things and states it ended up on her allergy list. Wellbutrin-wasn't helpful.   Seroquel - hypotension, sedation  Risperidone - \"Sleeping non-stop\" on a month  Lamotrigine - helpful but missed some doses, maybe up to 100 mg daily, last on about 5 years ago  Gabapentin - anxiety  risperidone-sedation  naltrexone    Substance Use History:  Current Use of Drugs/Alcohol: Alcohol not often; once a month socially; no drugs  Past Use of Drugs/Alcohol: Patient with history of alcohol, cannabis abuse  Patient reports no problems as a result of their drinking / drug use.   Patient has received chemical dependency treatment in the past at [facility]  Recovery Programming Involvement: None    Tobacco use: History of about 1 pack/day from ages [age] years old    Based on the clinical interview, there  are not indications of drug or alcohol abuse. Continue to monitor.   Discussed effect of substance use on overall health.     Past Medical History:  No past medical history on file.   Surgery: No past surgical history on file.  Food and Medicine Allergies:     Allergies   Allergen " Reactions     Augmentin      Nitrofurantoin Nausea and Vomiting and GI Disturbance     Sulfa Drugs      Seizures or Head Injury: No  Diet: No Restrictions  Exercise: Not discussed  Supplements: Reviewed per Electronic Medical Record Today    Current Medications:    Current Outpatient Medications:      Acetaminophen (TYLENOL PO), Take 325 mg by mouth as needed for mild pain or fever, Disp: , Rfl:     Vital Signs:  None since this is a phone/video visit.     Labs:  Most recent laboratory results reviewed and the pertinent results include:   Office Visit on 05/24/2021   Component Date Value Ref Range Status     PAP 05/24/2021 NIL   Final     Copath Report 05/24/2021    Final                    Value:  Patient Name: NEREIDA PHOENIX  MR#: [id number]  Specimen #: [id number]  Collected: 5/24/2021  Received: 5/25/2021  Reported: 5/26/2021 15:54  Ordering Phy(s): ALISA DE LUNA    For improved result formatting, select 'View Enhanced Report Format' under   Linked Documents section.    SPECIMEN/STAIN PROCESS:  Pap imaged thin layer prep screening (Surepath, FocalPoint with guided   screening)       Pap-Cyto x 1    SOURCE: Cervical, endocervical  ----------------------------------------------------------------   Pap imaged thin layer prep screening (Surepath, FocalPoint with guided   screening)  SPECIMEN ADEQUACY:  Satisfactory for evaluation.  -Transformation zone component absent.    CYTOLOGIC INTERPRETATION:    Negative for intraepithelial lesion or malignancy    Electronically signed out by:  SANJEEV Jones  (ASCP)    CLINICAL HISTORY:    Papanicolaou Test Limitations:  Cervical cytology is a screening test with   limited sensitivity; regular  screening is critical for cancer                           prevention; Pap tests are primarily   effective for the diagnosis/prevention of  squamous cell carcinoma, not adenocarcinomas or other cancers.    COLLECTION SITE:  Client:  [facility]  Location:  [facility] ([facility])    The technical component of this testing was completed at the [facility], with the professional component performed   at the [facility], [address] [place] [id number] ([phone])         Specimen Description 05/24/2021 Cervix   Final     Chlamydia Trachomatis PCR 05/24/2021 Negative  NEG^Negative Final    Comment: Negative for C. trachomatis rRNA by transcription mediated amplification.  A negative result by transcription mediated amplification does not preclude   the presence of C. trachomatis infection because results are dependent on   proper and adequate collection, absence of inhibitors, and sufficient rRNA to   be detected.       Color Urine 05/24/2021 Yellow   Final     Appearance Urine 05/24/2021 Clear   Final     Glucose Urine 05/24/2021 Negative  NEG^Negative mg/dL Final     Bilirubin Urine 05/24/2021 Negative  NEG^Negative Final     Ketones Urine 05/24/2021 Negative  NEG^Negative mg/dL Final     Specific Gravity Urine 05/24/2021 1.015  1.003 - 1.035 Final     Blood Urine 05/24/2021 Negative  NEG^Negative Final     pH Urine 05/24/2021 6.5  5.0 - 7.0 pH Final     Protein Albumin Urine 05/24/2021 Negative  NEG^Negative mg/dL Final     Urobilinogen Urine 05/24/2021 0.2  0.2 - 1.0 EU/dL Final     Nitrite Urine 05/24/2021 Negative  NEG^Negative Final     Leukocyte Esterase Urine 05/24/2021 Negative  NEG^Negative Final     Source 05/24/2021 Midstream Urine   Final     Specimen Description 05/24/2021 Vagina   Final     Wet Prep 05/24/2021 No Trichomonas seen   Final     Wet Prep 05/24/2021 No clue cells seen   Final     Wet Prep 05/24/2021 No yeast seen   Final     Wet Prep 05/24/2021    Final                    Value:Rare  WBC'S seen     Office Visit on 08/20/2020   Component Date Value Ref Range Status     Color Urine 08/20/2020 Yellow   Final     Appearance Urine  2020 Clear   Final     Glucose Urine 2020 Negative  NEG^Negative mg/dL Final     Bilirubin Urine 2020 Negative  NEG^Negative Final     Ketones Urine 2020 Negative  NEG^Negative mg/dL Final     Specific Gravity Urine 2020 1.020  1.003 - 1.035 Final     Blood Urine 2020 Moderate* NEG^Negative Final     pH Urine 2020 5.5  5.0 - 7.0 pH Final     Protein Albumin Urine 2020 30* NEG^Negative mg/dL Final     Urobilinogen Urine 2020 0.2  0.2 - 1.0 EU/dL Final     Nitrite Urine 2020 Negative  NEG^Negative Final     Leukocyte Esterase Urine 2020 Small* NEG^Negative Final     Source 2020 Midstream Urine   Final     WBC Urine 2020 25-50* OTO5^0 - 5 /HPF Final     RBC Urine 2020 O - 2  OTO2^O - 2 /HPF Final     WBC Clumps 2020 Present* NEG^Negative /HPF Final     Squamous Epithelial /LPF Urine 2020 Moderate* FEW^Few /LPF Final     Bacteria Urine 2020 Few* NEG^Negative /HPF Final     Specimen Description 2020 Midstream Urine   Final     Special Requests 2020 Specimen received in preservative   Final     Culture Micro 2020 *  Final                    Value:50,000 to 100,000 colonies/mL  Escherichia coli       Most recent EKG from 2018 reviewed. QTc interval 380.      Family History:   Patient reported family history includes:   Family History   Problem Relation Age of Onset     No Known Problems Mother      No Known Problems Father      No Known Problems Sister      No Known Problems Brother      No Known Problems Brother      Mental Illness History: Sister  Substance Abuse History: Sister  Suicide History: Yes: Sister  by suicide a couple weeks ago  Medications: Unknown     Social History Per [facility], Dr. Lakhwinder Chung, during today's team-based visit:   Patient reported they grew up in other [place], [place].  They were raised by biological parents;stepfather  .  Parents seperated / .  Patient reported that  "their childhood was Childhood was \"different.\" Parents  when an infant; remarried but  step-father around age 6. Lived with mom and spend weekends with dad. 2 younger half-brothers. 1 older sister. Dad was not around very much. Mom was not around either; over-prescribed medications and drank a lot. \"Had to raise myself.\" Patient described their current relationships with family of origin as \"much better now.\"       The patient describes their cultural background as . Cultural influences and impact on patient's life structure, values, norms, and healthcare: No cultural or Druze discrimination..  Contextual influences on patient's health include: Health- Seeking Factors  .    These factors will be addressed in the Preliminary Treatment plan. Patient identified their preferred language to be English. Patient reported they does not need the assistance of an  or other support involved in therapy.      Patient reported had no significant delays in developmental tasks.   Patient's highest education level was some college  .  Patient identified the following learning problems: none reported.  Modifications will not be used to assist communication in therapy. Patient reports they are  able to understand written materials.     Patient reported the following relationship history 1 relationship; engaged.  Patient's current relationship status is partner or significant other for 4.5 years.   Patient identified their sexual orientation as heterosexual.  Patient reported having  no child(brit). Patient identified partner;parents;siblings;pets;friends as part of their support system.  Patient identified the quality of these relationships as good,  .       Patient's current living/housing situation involves staying in own home/apartment.  They live with Pranav, 32,Significant Other and they report that housing is stable      Patient is currently employed fulltime.  Patient reports their finances " are obtained through employment;partner. 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    Patient does identify finances as a current stressor.      Firearms/Weapons Access: No: Patient denies   Service: No    Legal History:  No: Patient denies any legal history    Significant Losses / Trauma / Abuse / Neglect Issues:  There are indications or report of significant loss, trauma, abuse or neglect issues related to: See HPI and social history above.   Issues of possible neglect are not present.     Comprehensive Examination (limited due to virtual visit format, phone/video):  Vital Signs:  Vitals: There were no vitals taken for this visit.  General/Constitutional:  Appearance: awake, alert, adequately groomed, appeared stated age and no apparent distress  Attitude:  cooperative, pleasant  Eye Contact:  good  Musculoskeletal:  Muscle Strength and Tone: no gross abnormalities by observation  Psychomotor Behavior:  no evidence of tardive dyskinesia, dystonia, or tics  Gait and Station: normal, no gross abnormalities noted by observation  Psychiatric:  Speech:  clear, coherent, regular rate, rhythm, and volume  Associations:  no loose associations  Thought Process:  logical, linear and goal oriented  Thought Content:  no evidence of suicidal ideation or homicidal ideation, no evidence of psychotic thought, no auditory hallucinations present and no visual hallucinations present  Mood:  depressed  Affect:  appropriate and in normal range and mood congruent  Insight:  good  Judgment:  intact, adequate for safety  Impulse Control:  intact  Neurological:  Oriented to:  person, place, time, and situation  Attention Span and Concentration:  normal  Language: intact  Recent and Remote Memory:  Intact to interview. Not formally assessed. No amnesia.  Fund of Knowledge: appropriate    Strengths and Opportunities:   Marija Langley identified the following strengths or resources that may  help she succeed in counseling: commitment to health and well being, friends / good social support, family support, insight, intelligence and motivation. Things that may interfere with the patient's success include:  none noted at this time.    There are no language or communication issues or need for modification in treatment.   There are no ethnic, cultural or Sabianist factors that may be relevant for therapy.  Client identified their preferred language to be English.  Client does not need the assistance of an  or other support involved in therapy.    Suicide Risk Assessment:  Today Marija Langley reports no suicidal ideation. Based on all available evidence including the factors cited above, Marija Langley does not appear to be at imminent risk for self-harm, does not meet criteria for a 72-hr hold, and therefore remains appropriate for ongoing outpatient level of care.  A thorough assessment of risk factors related to suicide and self-harm have been reviewed and are noted above. The patient convincingly denies acute suicidality on several occasions. Local community safety resources reviewed and printed for patient to use if needed. There was no deceit detected, and the patient presented in a manner that was believable.     DSM5  Diagnosis:  296.89 Bipolar II Disorder Depressed   Generalized anxiety disorder  History of PTSD  History of eating disorder  Polysubstance abuse history-in sustained remission    Medical Comorbidities Include:   Patient Active Problem List    Diagnosis Date Noted     Bipolar affective disorder, remission status unspecified (H) 05/26/2021     Priority: Medium     Reports she didn't do well on meds in the past.       CATRACHITO (generalized anxiety disorder) 05/26/2021     Priority: Medium     Bulimia 05/26/2021     Priority: Medium     Personal history of urinary tract infection 05/26/2021     Priority: Medium     History of ureter surgery which helped but still gets UTI 1-2 x per  year.         Impression:  Marija Langley is a 21-year-old female with past psychiatric history including eating disorder, PTSD, bipolar disorder, anxiety, polysubstance abuse-in sustained remission who presents today for psychiatric evaluation.  Patient's mental health history dates back several years into childhood.  Struggled quite significantly with an eating disorder and substance abuse around 2015.  Patient completed inpatient and outpatient treatment for her eating disorder a couple different programs.  Treatment overall quite successful since she does not struggle much at all with disordered eating.  Will skip a meal every now and again but does not find herself significantly restricting and no longer purges.  No significant active PTSD symptoms.  Patient also not abusing drugs or alcohol for quite some time.  Presents today mainly with concerns for bipolar disorder and a desire to get back on psychiatric medications for her condition.  History of failing multiple antidepressants and doing well on mood stabilizers.  Feels like she did the best on lamotrigine.  Risperidone and Seroquel somewhat helpful for her symptoms but incredibly sedating.  At this time she is agreeable to restarting lamotrigine titration.  Discussed risks and benefits of treatment.  She is also hopeful to engage in individual psychotherapy again.  Has history of using gabapentin as needed for anxiety.  This was also prescribed today.  Will titrate dose as clinically indicated.  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  Propranolol trial started to take as needed for social anxiety symptoms.  Her blood pressure is on the soft side and so we will start incredibly low at 5-10 mg twice daily as needed.  She was instructed to discontinue if she feels too lightheaded or dizzy.  Hydroxyzine given to take at bedtime for sleep since she has been having some recent sleep struggles.  Hopefully her  sleep will improve as her mood is better stabilized on lamotrigine.  No safety concerns today.  No SI.  No drug or alcohol abuse.    Medication side effects and alternatives reviewed. Health promotion activities recommended and reviewed today. All questions addressed. Education and counseling completed regarding risks and benefits of medications and psychotherapy options. Recommend therapy for additional support.     Treatment Plan:     Start Lamotrigine: take 25 mg daily for two weeks then increase to 50 mg daily for two weeks then increase to 100 mg until follow-up appointment in about six weeks.      Start propranolol 5-10 mg twice daily as needed for social anxiety and/or physical symptoms of anxiety. Discontinue if you feel too lightheaded or dizzy.     Start gabapentin 100 mg three times daily as needed for anxiety symptoms.     Start hydroxyzine 10-30 mg at bedtime as needed for sleep.      Continue therapy as planned.    Continue all other cares per primary care provider.     Continue all other medications as reviewed per electronic medical record today.     Safety plan reviewed. To the Emergency Department as needed or call after hours crisis line at [phone] or [phone]. [place] Crisis Text Line: Text [place] to 728321  or  Suicide LifeLine Chat: suicidepreventionlifeline.org/chat    Schedule an appointment with me in 6 weeks or sooner as needed.  Call [facility] at [phone] to schedule.    Follow up with primary care provider as planned or sooner if needed for acute medical concerns.    Call the psychiatric nurse line with medication questions or concerns at [phone].    LiveRamphart may be used to communicate with your provider, but this is not intended to be used for emergencies.    Patient Education:  Discussed Lamictal (lamotrigine) can cause serious rashes including Gómez-Nima syndrome which may requiring hospitalization and discontinuation of treatment. If any signs of  a rash occur, please see your Primary Care Provider or a dermatologist immediately.     Community Resources:    National Suicide Prevention Lifeline: 245.506.6685 (TTY: 871.207.3178). Call anytime for help.  (www.suicidepreventionlifeline.org)  National [employer] on Mental Illness (www.nikkie.org): 550.366.5575 or 005-409-5512.   Mental Health Association (www.mentalhealth.org): 563.312.3615 or 847-764-0872.  [place] Crisis Text [place]: Text [place] to 209795  Suicide LifeLine Chat: suicideWowsai.org/chat    Administrative Billing:   Phone Call/Video Duration: 24 Minutes    Complexity of Care: Patient with multiple psychiatric diagnoses and multiple medication changes adding to complexity of care.     Patient Status:  Patient will continue to be seen for ongoing consultation and stabilization.    Signed:   Soheila Argueta DO  [facility]    Disclaimer: This note consists of symbols derived from keyboarding, dictation and/or voice recognition software. As a result, there may be errors in the script that have gone undetected. Please consider this when interpreting information found in this chart.

## 2021-07-30 NOTE — Clinical Note
Just FYI. No action needed.     Soheila Argueta, DO  [facility]  [facility]

## 2021-07-30 NOTE — Clinical Note
Please call this patient to get them scheduled for a follow-up visit in 6 weeks. Please schedule with me and the [facility]. Thanks!

## 2021-08-24 ENCOUNTER — TELEPHONE (OUTPATIENT)
Dept: PSYCHOLOGY | Facility: CLINIC | Age: 21
End: 2021-08-24

## 2021-08-24 NOTE — TELEPHONE ENCOUNTER
Reason for Call:  Other call back    Detailed comments: pt advised she was returning the nurse call on a refill question    Phone Number Patient can be reached at: Home number on file [phone] (home)    Best Time: asap    Can we leave a detailed message on this number? YES    Call taken on 8/24/2021 at 3:24 PM by Destiny Marcum

## 2021-08-24 NOTE — TELEPHONE ENCOUNTER
Wtr called pt back to check on status of refill request. She indicated that she submitted a refill request in error to [facility] when trying to update something else.    Wtr thanked her for this information & oriented that this RN would make note of it but nothing appears in the chart at this time.    Fazal Patel RN on 8/24/2021 at 4:19 PM

## 2021-09-21 ENCOUNTER — VIRTUAL VISIT (OUTPATIENT)
Dept: PSYCHIATRY | Facility: CLINIC | Age: 21
End: 2021-09-21
Payer: COMMERCIAL

## 2021-09-21 ENCOUNTER — VIRTUAL VISIT (OUTPATIENT)
Dept: BEHAVIORAL HEALTH | Facility: CLINIC | Age: 21
End: 2021-09-21
Payer: COMMERCIAL

## 2021-09-21 DIAGNOSIS — F41.1 GAD (GENERALIZED ANXIETY DISORDER): Primary | ICD-10-CM

## 2021-09-21 DIAGNOSIS — F41.1 GAD (GENERALIZED ANXIETY DISORDER): ICD-10-CM

## 2021-09-21 DIAGNOSIS — F31.9 BIPOLAR AFFECTIVE DISORDER, REMISSION STATUS UNSPECIFIED (H): ICD-10-CM

## 2021-09-21 DIAGNOSIS — F50.20 BULIMIA: ICD-10-CM

## 2021-09-21 DIAGNOSIS — F31.32 BIPOLAR AFFECTIVE DISORDER, CURRENTLY DEPRESSED, MODERATE (H): Primary | ICD-10-CM

## 2021-09-21 DIAGNOSIS — F19.11 SUBSTANCE ABUSE IN REMISSION (H): ICD-10-CM

## 2021-09-21 DIAGNOSIS — Z86.59 HX OF EATING DISORDER: ICD-10-CM

## 2021-09-21 DIAGNOSIS — Z86.59 HISTORY OF POSTTRAUMATIC STRESS DISORDER (PTSD): ICD-10-CM

## 2021-09-21 DIAGNOSIS — G47.00 INSOMNIA, UNSPECIFIED TYPE: ICD-10-CM

## 2021-09-21 PROCEDURE — 99214 OFFICE O/P EST MOD 30 MIN: CPT | Mod: 95 | Performed by: PSYCHIATRY & NEUROLOGY

## 2021-09-21 PROCEDURE — 90832 PSYTX W PT 30 MINUTES: CPT | Mod: 95 | Performed by: MARRIAGE & FAMILY THERAPIST

## 2021-09-21 RX ORDER — GABAPENTIN 100 MG/1
200 CAPSULE ORAL 2 TIMES DAILY PRN
Qty: 120 CAPSULE | Refills: 1 | Status: SHIPPED | OUTPATIENT
Start: 2021-09-21 | End: 2021-11-04 | Stop reason: ALTCHOICE

## 2021-09-21 RX ORDER — LAMOTRIGINE 200 MG/1
200 TABLET ORAL DAILY
Qty: 90 TABLET | Refills: 0 | Status: SHIPPED | OUTPATIENT
Start: 2021-10-05 | End: 2021-11-29

## 2021-09-21 RX ORDER — PROPRANOLOL HYDROCHLORIDE 20 MG/1
10-20 TABLET ORAL 2 TIMES DAILY PRN
COMMUNITY
End: 2021-10-19

## 2021-09-21 RX ORDER — DOXEPIN HYDROCHLORIDE 10 MG/ML
3-10 SOLUTION ORAL
Qty: 118 ML | Refills: 0 | Status: SHIPPED | OUTPATIENT
Start: 2021-09-21 | End: 2022-01-03

## 2021-09-21 RX ORDER — LAMOTRIGINE 150 MG/1
150 TABLET ORAL DAILY
Qty: 14 TABLET | Refills: 0 | Status: SHIPPED | OUTPATIENT
Start: 2021-09-21 | End: 2021-11-04 | Stop reason: DRUGHIGH

## 2021-09-21 ASSESSMENT — ANXIETY QUESTIONNAIRES
7. FEELING AFRAID AS IF SOMETHING AWFUL MIGHT HAPPEN: NOT AT ALL
3. WORRYING TOO MUCH ABOUT DIFFERENT THINGS: MORE THAN HALF THE DAYS
GAD7 TOTAL SCORE: 9
GAD7 TOTAL SCORE: 9
2. NOT BEING ABLE TO STOP OR CONTROL WORRYING: MORE THAN HALF THE DAYS
1. FEELING NERVOUS, ANXIOUS, OR ON EDGE: SEVERAL DAYS
GAD7 TOTAL SCORE: 9
8. IF YOU CHECKED OFF ANY PROBLEMS, HOW DIFFICULT HAVE THESE MADE IT FOR YOU TO DO YOUR WORK, TAKE CARE OF THINGS AT HOME, OR GET ALONG WITH OTHER PEOPLE?: SOMEWHAT DIFFICULT
7. FEELING AFRAID AS IF SOMETHING AWFUL MIGHT HAPPEN: NOT AT ALL
4. TROUBLE RELAXING: SEVERAL DAYS
6. BECOMING EASILY ANNOYED OR IRRITABLE: MORE THAN HALF THE DAYS
5. BEING SO RESTLESS THAT IT IS HARD TO SIT STILL: SEVERAL DAYS

## 2021-09-21 ASSESSMENT — PATIENT HEALTH QUESTIONNAIRE - PHQ9
SUM OF ALL RESPONSES TO PHQ QUESTIONS 1-9: 12
SUM OF ALL RESPONSES TO PHQ QUESTIONS 1-9: 12
10. IF YOU CHECKED OFF ANY PROBLEMS, HOW DIFFICULT HAVE THESE PROBLEMS MADE IT FOR YOU TO DO YOUR WORK, TAKE CARE OF THINGS AT HOME, OR GET ALONG WITH OTHER PEOPLE: SOMEWHAT DIFFICULT

## 2021-09-21 NOTE — PROGRESS NOTES
"Marija Langley is a 21 year old year old who is being evaluated via a billable video visit.      How would you like to obtain your AVS? MyChart  If you are dropped from the video visit, the video invite should be resent to: Text to cell phone: see Epic  Will anyone else be joining your video visit? No       Telemedicine Visit: The patient's condition can be safely assessed and treated via synchronous audio and visual telemedicine encounter.      Reason for Telemedicine Visit: Covid-19 Pandemic    Originating Site (Patient Location): Patient's home     Distant Site (Provider Location): Provider Remote Setting    Mode of Communication:  Video Conference via SafeTool    As the provider I attest to compliance with applicable laws and regulations related to telemedicine.        Outpatient Psychiatric Progress Note    Name: Marija Langley   : 2000                    Primary Care Provider: Cony Bishop PA-C   Therapist: None     PHQ-9 scores:  PHQ-9 SCORE 2021   PHQ-9 Total Score MyChart 17 (Moderately severe depression) 12 (Moderate depression)   PHQ-9 Total Score 17 12       CATRACHITO-7 scores:  CATRACHITO-7 SCORE 2021   Total Score 19 (severe anxiety) 9 (mild anxiety)   Total Score 19 9       Patient Identification:  Patient is a 21 year old, partnered / significant other  White Not  or  female  who presents for return visit with me.  Patient is currently a student. Patient attended the phone/video session alone. Patient prefers to be called: \"Marija\".    Interim History:  I last saw Marija Langley for outpatient psychiatry Consultation on 2021. During that appointment, we:      Start Lamotrigine: take 25 mg daily for two weeks then increase to 50 mg daily for two weeks then increase to 100 mg until follow-up appointment in about six weeks.      Start propranolol 5-10 mg twice daily as needed for social anxiety and/or physical symptoms of anxiety. Discontinue if " "you feel too lightheaded or dizzy.     Start gabapentin 100 mg three times daily as needed for anxiety symptoms.     Start hydroxyzine 10-30 mg at bedtime as needed for sleep.      Continue therapy as planned.    9/21: Overall reports doing relatively okay.  Slight improvement of symptoms since last visit.  Mood more noticeably improved and anxiety.  Feels like the edge has come off her anxiety some.  Uses gabapentin in the morning and afternoon most days.  Does find it somewhat helpful.  Hydroxyzine a little too sedating.  Not as helpful for sleep as she hoped.  Still struggling some with sleep.  Did not notice anything at all with propranolol.  No negative side effects.  Tolerating medications well.  No rash.  No safety concerns.  No SI.  No problematic drug or alcohol use.    Per [facility], THOMAS Smith, during today's team-based visit:  Lots of anxiety still. Lot's of anxiety around work. Works at group home. Feeling more irritability. Feels nervous about going to school in person next semester.   Mood is a lot better and more stable. Patient can fall asleep, but does not stay a sleep. Feeling really groggy when she wakes up. Would like a medication change for the sleep medication. Talked about therapy. Discussed her hesitation. Encouraged her to write down things she did like about her previous therapist and things she did not like. Told her to think as therapy as her interviewing someone for a job. Patient will think about this more.   Denied drug or alcohol problems. Reviewed safety with Patient. Patient denies any current SI,plans or intentions.     Past Psychiatric Med Trials:  Prozac/fluoxetine-in a \"rage\" on Prozac. Was throwing things and states it ended up on her allergy list. Wellbutrin-wasn't helpful.   Seroquel - hypotension, sedation  Risperidone - \"Sleeping non-stop\" on a month  Lamotrigine - helpful but missed some doses, maybe up to 100 mg daily, last on about 5 years ago  Gabapentin - " anxiety  risperidone-sedation  naltrexone    Psychiatric ROS:  Marija Langley reports mood has been: Improved  Anxiety has been: Slightly improved  Sleep has been: Still struggling  Leanna sxs: None  Psychosis sxs: None  ADHD/ADD sxs: N/A  PTSD sxs: None/stable  PHQ9 and GAD7 scores were reviewed today if completed.   Medication side effects: Yes: Excess sedation from hydroxyzine  Current stressors include: Symptoms and See HPI above  Coping mechanisms and supports include: Family, Hobbies and Friends    Current medications include:   Current Outpatient Medications   Medication Sig     Acetaminophen (TYLENOL PO) Take 325 mg by mouth as needed for mild pain or fever     gabapentin (NEURONTIN) 100 MG capsule Take 1 capsule (100 mg) by mouth 3 times daily as needed (anxiety)     hydrOXYzine (ATARAX) 10 MG tablet Take 1-3 tablets (10-30 mg) by mouth nightly as needed (sleep)     lamoTRIgine (LAMICTAL) 100 MG tablet Take 1 tablet (100 mg) by mouth daily     lamoTRIgine (LAMICTAL) 25 MG tablet Take 25 mg by mouth daily for 2 weeks then 50 mg daily for 2 weeks then 100 mg daily until f/u appt.     propranolol (INDERAL) 10 MG tablet Take 0.5-1 tablets (5-10 mg) by mouth 2 times daily as needed (social anxiety)     No current facility-administered medications for this visit.       Past Medical/Surgical History:  Past Medical History:   Diagnosis Date     Anorexia nervosa      Anxiety      Bulimia nervosa      Depressive disorder       has a past medical history of Anorexia nervosa, Anxiety, Bulimia nervosa, and Depressive disorder.    Social History:  Reviewed. No changes to social history except as noted above in HPI.    Vital Signs:   None. This is phone/video visit.     Labs:  Most recent laboratory results reviewed and no new labs.     Review of Systems:  10 systems (general, cardiovascular, respiratory, eyes, ENT, endocrine, GI, , M/S, neurological) were reviewed. Most pertinent finding(s) is/are: denies fever, cough,  headaches, shortness of breath, chest pain, N/V, constipation/diarrhea, trouble urinating, aches and pains. The remaining systems are all unremarkable.    Mental Status Examination (limited as this is by phone/video):  Appearance: Awake, alert, appears stated age, no acute distress, well-groomed   Attitude:  cooperative, pleasant   Motor: No gross abnormalities observed via video, not formally tested   Oriented to:  person, place, time, and situation  Attention Span and Concentration:  normal  Speech:  clear, coherent, regular rate, rhythm, and volume  Language: intact  Mood:  better  Affect:  appropriate and in normal range and mood congruent  Associations:  no loose associations  Thought Process:  logical, linear and goal oriented  Thought Content:  no evidence of suicidal ideation or homicidal ideation, no evidence of psychotic thought, no auditory hallucinations present and no visual hallucinations present  Recent and Remote Memory:  Intact to interview. Not formally assessed. No amnesia.  Fund of Knowledge: appropriate  Insight:  good  Judgment:  intact, adequate for safety  Impulse Control:  intact    Suicide Risk Assessment:  Today Marija Langley reports no suicidal ideation. Based on all available evidence including the factors cited above, Marija Langley does not appear to be at imminent risk for self-harm, does not meet criteria for a 72-hr hold, and therefore remains appropriate for ongoing outpatient level of care.  A thorough assessment of risk factors related to suicide and self-harm have been reviewed and are noted above. The patient convincingly denies suicidality on several occasions. Local community safety resources printed and reviewed for patient to use if needed. There was no deceit detected, and the patient presented in a manner that was believable.     DSM5 Diagnosis:  296.89 Bipolar II Disorder Depressed   Generalized anxiety disorder  Insomnia, unspecified  History of PTSD  History of eating  disorder  Polysubstance abuse history-in sustained remission    Medical comorbidities include:   Patient Active Problem List    Diagnosis Date Noted     Bipolar affective disorder, remission status unspecified (H) 05/26/2021     Priority: Medium     Reports she didn't do well on meds in the past.       CATRACHITO (generalized anxiety disorder) 05/26/2021     Priority: Medium     Bulimia 05/26/2021     Priority: Medium     Personal history of urinary tract infection 05/26/2021     Priority: Medium     History of ureter surgery which helped but still gets UTI 1-2 x per year.         Psychosocial & Contextual Factors: see HPI above    Assessment:  From Intake, 7/30/2021:  Marija Langley is a 21-year-old female with past psychiatric history including eating disorder, PTSD, bipolar disorder, anxiety, polysubstance abuse-in sustained remission who presents today for psychiatric evaluation.  Patient's mental health history dates back several years into childhood.  Struggled quite significantly with an eating disorder and substance abuse around 2015.  Patient completed inpatient and outpatient treatment for her eating disorder a couple different programs.  Treatment overall quite successful since she does not struggle much at all with disordered eating.  Will skip a meal every now and again but does not find herself significantly restricting and no longer purges.  No significant active PTSD symptoms.  Patient also not abusing drugs or alcohol for quite some time.  Presents today mainly with concerns for bipolar disorder and a desire to get back on psychiatric medications for her condition.  History of failing multiple antidepressants and doing well on mood stabilizers.  Feels like she did the best on lamotrigine.  Risperidone and Seroquel somewhat helpful for her symptoms but incredibly sedating.  At this time she is agreeable to restarting lamotrigine titration.  Discussed risks and benefits of treatment.  She is also hopeful to  engage in individual psychotherapy again.  Has history of using gabapentin as needed for anxiety.  This was also prescribed today.  Will titrate dose as clinically indicated.  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  Propranolol trial started to take as needed for social anxiety symptoms.  Her blood pressure is on the soft side and so we will start incredibly low at 5-10 mg twice daily as needed.  She was instructed to discontinue if she feels too lightheaded or dizzy.  Hydroxyzine given to take at bedtime for sleep since she has been having some recent sleep struggles.  Hopefully her sleep will improve as her mood is better stabilized on lamotrigine.  No safety concerns today.  No SI.  No drug or alcohol abuse.    9/21/2021:   Patient overall with improvement of mood since starting lamotrigine titration.  Anxiety still high at times but slightly decreased as well.  Still struggling asleep and hydroxyzine not very effective and caused too much excess sedation next day.  Did not notice anything with propranolol and so we will increase the dose to 20 mg up to twice daily as needed for anxiety symptoms.  Tolerating gabapentin well and finding it helpful.  We will continue to optimize gabapentin therapy.  Continue titrating lamotrigine.  We will start trial doxepin for sleep.  Also discussed possibly using doxylamine succinate if doxepin is not helpful.  No safety concerns or SI.  No problematic drug or alcohol use.    Medication side effects and alternatives were reviewed. Health promotion activities recommended and reviewed today. All questions addressed. Education and counseling completed regarding risks and benefits of medications and psychotherapy options. Recommend therapy for additional support.     Treatment Plan:    Increase gabapentin to 200 mg twice daily as needed for anxiety.    Increase propranolol to 10-20 mg up to twice daily as needed for anxiety  symptoms.    Start doxepin 3-10 mg (0.3-1.0 mL) at bedtime as needed for sleep.  If doxepin not well-tolerated or effective, you can try doxylamine succinate 12.5-25 mg at bedtime for sleep (doxepin succinate is found over-the-counter).    Increase lamotrigine to 150 mg daily for two weeks and then increase to 200 mg daily for mood/bipolar disorder.    Continue all other cares per primary care provider.     Continue all other medications as reviewed per electronic medical record today.     Safety plan reviewed. To the Emergency Department as needed or call after hours crisis line at [phone] or [phone]. [place] Crisis Text Line. Text [place] to 805383 or Suicide LifeLine Chat: suicidepreventionSenesco Technologiesline.org/chat    Recommend individual psychotherapy.    Schedule an appointment with me in 6 weeks or sooner as needed. Call [facility] at [phone] to schedule.    Follow up with primary care provider as planned or for acute medical concerns.    Call the psychiatric nurse line with medication questions or concerns at [phone].    Safe Shipping Inspectorst may be used to communicate with your provider, but this is not intended to be used for emergencies.    Have previously discussed Lamictal (lamotrigine) can cause serious rashes including Gómez-Nima syndrome which may requiring hospitalization and discontinuation of treatment. If any signs of a rash occur, please see your Primary Care Provider or a dermatologist immediately.     Administrative Billing:   Phone Call/Video Duration: 11 Minutes    Time spent with patient was 11 minutes and greater than 50% of time or 6 minutes was spent in counseling and coordination of care regarding above diagnoses and treatment plan. Patient with multiple psychiatric diagnoses and multiple medication changes adding to complexity of care.    Patient Status:  Patient will continue to be seen for ongoing consultation and stabilization.    Signed:   Soheila Argueta DO  [facility]  Psychiatry    Disclaimer: This note consists of symbols derived from keyboarding, dictation and/or voice recognition software. As a result, there may be errors in the script that have gone undetected. Please consider this when interpreting information found in this chart.

## 2021-09-21 NOTE — PATIENT INSTRUCTIONS
Treatment Plan:    Increase gabapentin to 200 mg twice daily as needed for anxiety.    Increase propranolol to 10-20 mg up to twice daily as needed for anxiety symptoms.    Start doxepin 3-10 mg (0.3-1.0 mL) at bedtime as needed for sleep.  If doxepin not well-tolerated or effective, you can try doxylamine succinate 12.5-25 mg at bedtime for sleep (doxepin succinate is found over-the-counter).    Increase lamotrigine to 150 mg daily for two weeks and then increase to 200 mg daily for mood/bipolar disorder.    Continue all other cares per primary care provider.     Continue all other medications as reviewed per electronic medical record today.     Safety plan reviewed. To the Emergency Department as needed or call after hours crisis line at [phone] or [phone]. [place] Crisis Text Line. Text [place] to 372500 or Suicide LifeLine Chat: suicidepreventionlifeline.org/chat    Recommend individual psychotherapy.    Schedule an appointment with me in 6 weeks or sooner as needed. Call [facility] at [phone] to schedule.    Follow up with primary care provider as planned or for acute medical concerns.    Call the psychiatric nurse line with medication questions or concerns at [phone].    Selpheehart may be used to communicate with your provider, but this is not intended to be used for emergencies.    Have previously discussed Lamictal (lamotrigine) can cause serious rashes including Gómez-Nima syndrome which may requiring hospitalization and discontinuation of treatment. If any signs of a rash occur, please see your Primary Care Provider or a dermatologist immediately.

## 2021-09-21 NOTE — PROGRESS NOTES
Collaborative Care Psychiatry Service (CCPS)  September 21, 2021    Behavioral Health Clinician Progress Note    Patient Name: Marija Langley      Telemedicine Visit: The patient's condition can be safely assessed and treated via synchronous audio and visual telemedicine encounter.      Reason for Telemedicine Visit: Services only offered telehealth    Originating Site (Patient Location): Patient's home    Distant Site (Provider Location): [facility]: [place]    Consent:  The patient/guardian has verbally consented to: the potential risks and benefits of telemedicine (video visit) versus in person care; bill my insurance or make self-payment for services provided; and responsibility for payment of non-covered services.     Mode of Communication:  Video Conference via [employer]    As the provider I attest to compliance with applicable laws and regulations related to telemedicine.         Service Type:  Individual      Service Location:   Face to Face in Home / Community     Session Start Time: 9:00  am  Session End Time: 9:16 am      Session Length: 16 - 37      Attendees: Client    Visit Activities (Refresh list every visit): [facility] Only    Diagnostic Assessment Date: 7/30/21 Lakhwinder Chung  See Flowsheets for today's PHQ-9 and CATRACHITO-7 results  Previous PHQ-9:   PHQ-9 SCORE 7/29/2021 9/21/2021   PHQ-9 Total Score MyChart 17 (Moderately severe depression) 12 (Moderate depression)   PHQ-9 Total Score 17 12       Previous CATRACHITO-7:   CATRACHITO-7 SCORE 7/29/2021 9/21/2021   Total Score 19 (severe anxiety) 9 (mild anxiety)   Total Score 19 9       WHODAS  WHODAS 2.0 Total Score 7/29/2021   Total Score 33   Total Score MyChart 33        CAGE  CAGE-AID Flowsheet 7/29/2021   Have you ever felt you should Cut down on your drinking or drug use? 0   Have people Annoyed you by criticizing your drinking or drug use? 0   Have you ever felt bad or Guilty about your drinking or drug use? 0   Have you ever had a drink or used  drugs first thing in the morning to steady your nerves or to get rid of a hangover? (Eye opener) 0   CAGE-AID SCORE 0   1. Have you felt you ought to cut down on your drinking or drug use? No   2. Have people annoyed you by criticizing your drinking or drug use? No   3. Have you felt bad or guilty about your drinking or drug use? No   4. Have you ever had a drink or used drugs first thing in the morning to steady your nerves or to get rid of a hangover (eye opener)? No   CAGE-AID SCORE 0 (A total score of 2 or greater is considered clinically significant)         DATA  Extended Session (60+ minutes): No  Interactive Complexity: No  Crisis: No    Medication Compliance:  Yes      Chemical Use Review:   Substance Use: Chemical use reviewed, no active concerns identified      Tobacco Use: No current tobacco use.      Current Stressors / Issues:  Lots of anxiety still. Lot's of anxiety around work. Works at group home. Feeling more irritability. Feels nervous about going to school in person next semester.   Mood is a lot better and more stable. Patient can fall asleep, but does not stay a sleep. Feeling really groggy when she wakes up. Would like a medication change for the sleep medication. Talked about therapy. Discussed her hesitation. Encouraged her to write down things she did like about her previous therapist and things she did not like. Told her to think as therapy as her interviewing someone for a job. Patient will think about this more.   Denied drug or alcohol problems. Reviewed safety with Patient. Patient denies any current SI,plans or intentions.       Progress on Treatment Objective(s) / Homework:  New Objective established this session - PREPARATION (Decided to change - considering how); Intervened by negotiating a change plan and determining options / strategies for behavior change, identifying triggers, exploring social supports, and working towards setting a date to begin behavior change    Motivational  Interviewing    MI Intervention: Expressed Empathy/Understanding, Supported Autonomy, Collaboration, Evocation, Permission to raise concern or advise, Open-ended questions and Reflections: simple and complex     Change Talk Expressed by the Patient: Desire to change Ability to change Reasons to change Need to change Committment to change    Provider Response to Change Talk: E - Evoked more info from patient about behavior change, A - Affirmed patient's thoughts, decisions, or attempts at behavior change, R - Reflected patient's change talk and S - Summarized patient's change talk statements      Situation        Automatic Thoughts  Cognitive Distortions      Feelings        Behavior        Questioning Thoughts            Also provided psychoeducation about behavioral health condition, symptoms, and treatment options      Review of Symptoms per patient report:  Depression: Change in sleep, Lack of interest, Excessive or inappropriate guilt, Change in energy level, Difficulties concentrating, Ruminations, Irritability and Feeling sad, down, or depressed  Leanna:  No Symptoms  Psychosis: No Symptoms  Anxiety: Excessive worry, Nervousness, Physical complaints, such as headaches, stomachaches, muscle tension, Psychomotor agitation, Ruminations, Poor concentration and Irritability  Panic:  No symptoms  Post Traumatic Stress Disorder:  No Symptoms   Eating Disorder: Restriction and Purging  ADD / ADHD:  No symptoms  Conduct Disorder: No symptoms  Autism Spectrum Disorder: No symptoms  Obsessive Compulsive Disorder: No Symptoms      Changes in Health Issues:   None reported    Assessment: Current Emotional / Mental Status (status of significant symptoms):  Risk status (Self / Other harm or suicidal ideation)  Patient has had a history of suicidal ideation: in the pasty and suicide attempts: as a teenager  Patient denies current fears or concerns for personal safety.  Patient denies current or recent suicidal ideation or  behaviors.  Patient denies current or recent homicidal ideation or behaviors.  Patient denies current or recent self injurious behavior or ideation.  Patient denies other safety concerns.  A safety and risk management plan has been developed including: Patient consented to co-developed safety plan.  A safety and risk management plan was completed.  Patient agreed to use safety plan should any safety concerns arise.  A copy was given to the patient.    Appearance:   Appropriate   Eye Contact:   Good   Psychomotor Behavior: Normal   Attitude:   Cooperative   Orientation:   All  Speech   Rate / Production: Normal    Volume:  Normal   Mood:    Normal  Affect:    Appropriate   Thought Content:  Clear   Thought Form:  Coherent  Logical   Insight:    Good        Diagnoses:  1. CATRACHITO (generalized anxiety disorder)    2. Bulimia    3. Bipolar affective disorder, remission status unspecified (H)        Collateral Reports Completed:  Communicated with:     Plan: (Homework, other):  Patient was given information about behavioral services and encouraged to schedule a follow up appointment with the clinic [facility].  She was also given information about mental health symptoms and treatment options .  CD Recommendations: No indications of CD issues.  THOMAS Smith, [facility]      THOMAS Hemphill  September 21, 2021    Answers for HPI/ROS submitted by the patient on 9/21/2021  If you checked off any problems, how difficult have these problems made it for you to do your work, take care of things at home, or get along with other people?: Somewhat difficult  PHQ9 TOTAL SCORE: 12  CATRACHITO 7 TOTAL SCORE: 9

## 2021-09-21 NOTE — Clinical Note
Please call this patient to get them scheduled for a follow-up visit in 6 weeks. Please schedule with me and the [facility]. Thanks!

## 2021-09-22 ASSESSMENT — ANXIETY QUESTIONNAIRES: GAD7 TOTAL SCORE: 9

## 2021-09-22 ASSESSMENT — PATIENT HEALTH QUESTIONNAIRE - PHQ9: SUM OF ALL RESPONSES TO PHQ QUESTIONS 1-9: 12

## 2021-10-10 ENCOUNTER — HEALTH MAINTENANCE LETTER (OUTPATIENT)
Age: 21
End: 2021-10-10

## 2021-10-19 ENCOUNTER — MYC REFILL (OUTPATIENT)
Dept: FAMILY MEDICINE | Facility: CLINIC | Age: 21
End: 2021-10-19

## 2021-10-19 DIAGNOSIS — F41.1 GAD (GENERALIZED ANXIETY DISORDER): Primary | ICD-10-CM

## 2021-10-19 RX ORDER — PROPRANOLOL HYDROCHLORIDE 20 MG/1
10-20 TABLET ORAL 2 TIMES DAILY PRN
Qty: 90 TABLET | Refills: 1 | Status: SHIPPED | OUTPATIENT
Start: 2021-10-19 | End: 2021-11-04 | Stop reason: DRUGHIGH

## 2021-11-04 ENCOUNTER — VIRTUAL VISIT (OUTPATIENT)
Dept: PSYCHIATRY | Facility: CLINIC | Age: 21
End: 2021-11-04
Payer: COMMERCIAL

## 2021-11-04 ENCOUNTER — VIRTUAL VISIT (OUTPATIENT)
Dept: PSYCHOLOGY | Facility: CLINIC | Age: 21
End: 2021-11-04
Payer: COMMERCIAL

## 2021-11-04 DIAGNOSIS — F41.1 GAD (GENERALIZED ANXIETY DISORDER): ICD-10-CM

## 2021-11-04 DIAGNOSIS — Z86.59 HX OF EATING DISORDER: ICD-10-CM

## 2021-11-04 DIAGNOSIS — F19.11 SUBSTANCE ABUSE IN REMISSION (H): ICD-10-CM

## 2021-11-04 DIAGNOSIS — F31.75 BIPOLAR DISORDER, IN PARTIAL REMISSION, MOST RECENT EPISODE DEPRESSED (H): Primary | ICD-10-CM

## 2021-11-04 DIAGNOSIS — Z86.59 HISTORY OF POSTTRAUMATIC STRESS DISORDER (PTSD): ICD-10-CM

## 2021-11-04 DIAGNOSIS — F31.9 BIPOLAR AFFECTIVE DISORDER, REMISSION STATUS UNSPECIFIED (H): Primary | ICD-10-CM

## 2021-11-04 DIAGNOSIS — G47.00 INSOMNIA, UNSPECIFIED TYPE: ICD-10-CM

## 2021-11-04 PROCEDURE — 90832 PSYTX W PT 30 MINUTES: CPT | Mod: 95 | Performed by: PSYCHOLOGIST

## 2021-11-04 PROCEDURE — 99214 OFFICE O/P EST MOD 30 MIN: CPT | Mod: 95 | Performed by: PSYCHIATRY & NEUROLOGY

## 2021-11-04 RX ORDER — PROPRANOLOL HCL 60 MG
60 CAPSULE, EXTENDED RELEASE 24HR ORAL DAILY
Qty: 90 CAPSULE | Refills: 0 | Status: SHIPPED | OUTPATIENT
Start: 2021-11-04 | End: 2021-11-10

## 2021-11-04 NOTE — PROGRESS NOTES
Collaborative Care Psychiatry Service (CCPS)  September 21, 2021    Behavioral Health Clinician Progress Note    Patient Name: Marija Langley      Telemedicine Visit: The patient's condition can be safely assessed and treated via synchronous audio and visual telemedicine encounter.      Reason for Telemedicine Visit: Services only offered telehealth    Originating Site (Patient Location): Patient's home    Distant Site (Provider Location): Provider Remote Setting- Home Office    Consent:  The patient/guardian has verbally consented to: the potential risks and benefits of telemedicine (video visit) versus in person care; bill my insurance or make self-payment for services provided; and responsibility for payment of non-covered services.     Mode of Communication:  Video Conference via [employer]    As the provider I attest to compliance with applicable laws and regulations related to telemedicine.         Service Type:  Individual      Session Start Time: 10:30am  Session End Time: 10:50am      Session Length: 16 - 37      Attendees: Client    Visit Activities (Refresh list every visit): [facility] [place] Only    Diagnostic Assessment Date: 7/30/21 Lakhwinder Chung  See Flowsheets for today's PHQ-9 and CATRACHITO-7 results  Previous PHQ-9:   PHQ-9 SCORE 7/29/2021 9/21/2021   PHQ-9 Total Score MyChart 17 (Moderately severe depression) 12 (Moderate depression)   PHQ-9 Total Score 17 12       Previous CATRACHITO-7:   CATRACHITO-7 SCORE 7/29/2021 9/21/2021   Total Score 19 (severe anxiety) 9 (mild anxiety)   Total Score 19 9       WHODAS  WHODAS 2.0 Total Score 7/29/2021   Total Score 33   Total Score MyChart 33        CAGE  CAGE-AID Flowsheet 7/29/2021   Have you ever felt you should Cut down on your drinking or drug use? 0   Have people Annoyed you by criticizing your drinking or drug use? 0   Have you ever felt bad or Guilty about your drinking or drug use? 0   Have you ever had a drink or used drugs first thing in the morning to steady your nerves or to  get rid of a hangover? (Eye opener) 0   CAGE-AID SCORE 0   1. Have you felt you ought to cut down on your drinking or drug use? No   2. Have people annoyed you by criticizing your drinking or drug use? No   3. Have you felt bad or guilty about your drinking or drug use? No   4. Have you ever had a drink or used drugs first thing in the morning to steady your nerves or to get rid of a hangover (eye opener)? No   CAGE-AID SCORE 0 (A total score of 2 or greater is considered clinically significant)         DATA  Extended Session (60+ minutes): No  Interactive Complexity: No  Crisis: No    Medication Compliance:  Yes      Chemical Use Review:   Substance Use: Chemical use reviewed, no active concerns identified      Tobacco Use: No current tobacco use.      Current Stressors / Issues:  Reported that her mood has been more stable, her sleep is improved as well. Overall, she is very pleased with the recent medication changes. Her anxiety is less intense, and she finds that the doxepin does not make her feel hung-over the next morning. She expressed ongoing interest in therapy options. We discussed different approaches and what she may find helpful.      Progress on Treatment Objective(s) / Homework:  Satisfactory progress - ACTION (Actively working towards change); Intervened by reinforcing change plan / affirming steps taken    Motivational Interviewing    MI Intervention: Expressed Empathy/Understanding, Supported Autonomy, Collaboration, Evocation, Permission to raise concern or advise, Open-ended questions and Reflections: simple and complex     Change Talk Expressed by the Patient: Activation Taking steps    Provider Response to Change Talk: E - Evoked more info from patient about behavior change, A - Affirmed patient's thoughts, decisions, or attempts at behavior change, R - Reflected patient's change talk and S - Summarized patient's change talk statements    Also provided psychoeducation about behavioral health  condition, symptoms, and treatment options      Review of Symptoms per patient report as of 07/30/2021:  Depression: Change in sleep, Lack of interest, Excessive or inappropriate guilt, Change in energy level, Difficulties concentrating, Ruminations, Irritability and Feeling sad, down, or depressed  Leanna:  No Symptoms  Psychosis: No Symptoms  Anxiety: Excessive worry, Nervousness, Physical complaints, such as headaches, stomachaches, muscle tension, Psychomotor agitation, Ruminations, Poor concentration and Irritability  Panic:  No symptoms  Post Traumatic Stress Disorder:  No Symptoms   Eating Disorder: Restriction and Purging  ADD / ADHD:  No symptoms  Conduct Disorder: No symptoms  Autism Spectrum Disorder: No symptoms  Obsessive Compulsive Disorder: No Symptoms      Changes in Health Issues:   None reported    Assessment: Current Emotional / Mental Status (status of significant symptoms):  Risk status (Self / Other harm or suicidal ideation)  Patient has had a history of suicidal ideation: in the pasty and suicide attempts: as a teenager  Patient denies current fears or concerns for personal safety.  Patient denies current or recent suicidal ideation or behaviors.  Patient denies current or recent homicidal ideation or behaviors.  Patient denies current or recent self injurious behavior or ideation.  Patient denies other safety concerns.  A safety and risk management plan has been developed including: Patient consented to co-developed safety plan.  A safety and risk management plan was completed.  Patient agreed to use safety plan should any safety concerns arise.  A copy was given to the patient.    Appearance:   Appropriate   Eye Contact:   Good   Psychomotor Behavior: Normal   Attitude:   Cooperative   Orientation:   All  Speech   Rate / Production: Normal    Volume:  Normal   Mood:    Normal  Affect:    Appropriate   Thought Content:  Clear   Thought Form:  Coherent  Logical   Insight:    Good         Diagnoses:  1. Bipolar affective disorder, remission status unspecified (H)    2. CATRACHITO (generalized anxiety disorder)        Collateral Reports Completed:  Communicated with:     Plan: (Homework, other):  Patient was given information about behavioral services and encouraged to schedule a follow up appointment with the clinic [facility] in conjunction with next [facility] appointment.  She was also given information about mental health symptoms and treatment options .  CD Recommendations: No indications of CD issues.      Lakhwinder Chung PsyD, LP      Jerry Chung PsyD  September 21, 2021

## 2021-11-04 NOTE — PATIENT INSTRUCTIONS
Treatment Plan:    Discontinue gabapentin     Discontinue hydroxyzine    Discontinue propranolol immediate release    Start propranolol extended release 60 mg daily for generalized anxiety disorder.    Continue doxepin 3-10 mg (0.3-1.0 mL) at bedtime as needed for sleep.     Continue lamotrigine 200 mg daily for mood/bipolar disorder.    Continue all other cares per primary care provider.     Continue all other medications as reviewed per electronic medical record today.     Safety plan reviewed. To the Emergency Department as needed or call after hours crisis line at [phone] or [phone]. [place] Crisis Text Line. Text [place] to 402145 or Suicide LifeLine Chat: suicidepreventionlifeline.org/chat    Recommend individual psychotherapy.    Schedule an appointment with me in about 2 months or sooner as needed. Call [facility] at [phone] to schedule.    Follow up with primary care provider as planned or for acute medical concerns.    Call the psychiatric nurse line with medication questions or concerns at [phone].    [employer] may be used to communicate with your provider, but this is not intended to be used for emergencies.    Have previously discussed Lamictal (lamotrigine) can cause serious rashes including Gómez-Nima syndrome which may requiring hospitalization and discontinuation of treatment. If any signs of a rash occur, please see your Primary Care Provider or a dermatologist immediately.

## 2021-11-04 NOTE — Clinical Note
Please call this patient to get them scheduled for a follow-up visit in 2 months. Please schedule with me and the [facility]. Thanks!

## 2021-11-04 NOTE — PROGRESS NOTES
"Marija Langley is a 21 year old year old who is being evaluated via a billable video visit.      How would you like to obtain your AVS? MyChart  If you are dropped from the video visit, the video invite should be resent to: Text to cell phone: see Epic  Will anyone else be joining your video visit? No     Telemedicine Visit: The patient's condition can be safely assessed and treated via synchronous audio and visual telemedicine encounter.      Reason for Telemedicine Visit: Covid-19 Pandemic    Originating Site (Patient Location): Patient's home     Distant Site (Provider Location): Provider Remote Setting    Mode of Communication:  Video Conference via [employer]    As the provider I attest to compliance with applicable laws and regulations related to telemedicine.        Outpatient Psychiatric Progress Note    Name: Marija Langley   : 2000                    Primary Care Provider: Cony Bishop PA-C   Therapist: None     PHQ-9 scores:  PHQ-9 SCORE 2021   PHQ-9 Total Score MyChart 17 (Moderately severe depression) 12 (Moderate depression)   PHQ-9 Total Score 17 12       CATRACHITO-7 scores:  CATRACHITO-7 SCORE 2021   Total Score 19 (severe anxiety) 9 (mild anxiety)   Total Score 19 9       Patient Identification:  Patient is a 21 year old, partnered / significant other  White Not  or  female  who presents for return visit with me.  Patient is currently a student. Patient attended the phone/video session alone. Patient prefers to be called: \"Marija\".    Interim History:  I last saw Marija Langley for outpatient psychiatry return visit on 2021. During that appointment, we:     Increase gabapentin to 200 mg twice daily as needed for anxiety.    Increase propranolol to 10-20 mg up to twice daily as needed for anxiety symptoms.    Start doxepin 3-10 mg (0.3-1.0 mL) at bedtime as needed for sleep.  If doxepin not well-tolerated or effective, you can try doxylamine " "succinate 12.5-25 mg at bedtime for sleep (doxepin succinate is found over-the-counter).    Increase lamotrigine to 150 mg daily for two weeks and then increase to 200 mg daily for mood/bipolar disorder.    11/4: Pt overall feels like her medication regimen is working well. Doxepin working well for sleep. Helps her feel asleep and stay asleep. Mood stable and not so up and down.  Appreciates the propranolol.  Uses 20 mg propranolol twice a day every day.  Has just been using gabapentin 200 mg in the morning.  Has not been taking hydroxyzine since doxepin more helpful for sleep.  No acute safety concerns.  No SI.  No problematic drug or alcohol use.    Per [facility], Dr. Lakhwinder Chung, during today's team-based visit:  Reported that her mood has been more stable, her sleep is improved as well. Overall, she is very pleased with the recent medication changes. Her anxiety is less intense, and she finds that the doxepin does not make her feel hung-over the next morning. She expressed ongoing interest in therapy options. We discussed different approaches and what she may find helpful.    Past Psychiatric Med Trials:  Prozac/fluoxetine-in a \"rage\" on Prozac. Was throwing things and states it ended up on her allergy list. Wellbutrin-wasn't helpful.   Seroquel - hypotension, sedation  Risperidone - \"Sleeping non-stop\" on a month  Lamotrigine - helpful but missed some doses, maybe up to 100 mg daily, last on about 5 years ago  Gabapentin - anxiety  risperidone-sedation  Naltrexone  Hydroxyzine-not as effective as doxepin for sleep so discontinued    Psychiatric ROS:  Marija Langley reports mood has been: Improved/stable  Anxiety has been: Improved/stable  Sleep has been: Much improved on doxepin  Lenana sxs: None  Psychosis sxs: None  ADHD/ADD sxs: N/A  PTSD sxs: None/stable  PHQ9 and GAD7 scores were reviewed today if completed.   Medication side effects: Denies  Current stressors include: Symptoms and See HPI above  Coping mechanisms " and supports include: Family, Hobbies and Friends    Current medications include:   Current Outpatient Medications   Medication Sig     Acetaminophen (TYLENOL PO) Take 325 mg by mouth as needed for mild pain or fever     doxepin (SINEQUAN) 10 MG/ML (HIGH CONC) solution Take 0.3-1 mLs (3-10 mg) by mouth nightly as needed for sleep     gabapentin (NEURONTIN) 100 MG capsule Take 2 capsules (200 mg) by mouth 2 times daily as needed (anxiety)     hydrOXYzine (ATARAX) 10 MG tablet Take 1-3 tablets (10-30 mg) by mouth nightly as needed (sleep)     lamoTRIgine (LAMICTAL) 150 MG tablet Take 1 tablet (150 mg) by mouth daily     lamoTRIgine (LAMICTAL) 200 MG tablet Take 1 tablet (200 mg) by mouth daily     propranolol (INDERAL) 20 MG tablet Take 0.5-1 tablets (10-20 mg) by mouth 2 times daily as needed     No current facility-administered medications for this visit.       Past Medical/Surgical History:  Past Medical History:   Diagnosis Date     Anorexia nervosa      Anxiety      Bulimia nervosa      Depressive disorder       has a past medical history of Anorexia nervosa, Anxiety, Bulimia nervosa, and Depressive disorder.    Social History:  Reviewed. No changes to social history except as noted above in HPI.    Vital Signs:   None. This is phone/video visit.     Labs:  Most recent laboratory results reviewed and no new labs.     Review of Systems:  10 systems (general, cardiovascular, respiratory, eyes, ENT, endocrine, GI, , M/S, neurological) were reviewed. Most pertinent finding(s) is/are: denies fever, cough, headaches, shortness of breath, chest pain, N/V, constipation/diarrhea, trouble urinating, aches and pains. The remaining systems are all unremarkable.    Mental Status Examination (limited as this is by phone/video):  Appearance: Awake, alert, appears stated age, no acute distress, well-groomed   Attitude:  cooperative, pleasant   Motor: No gross abnormalities observed via video, not formally tested   Oriented to:   person, place, time, and situation  Attention Span and Concentration:  normal  Speech:  clear, coherent, regular rate, rhythm, and volume  Language: intact  Mood: Pretty good  Affect:  appropriate and in normal range and mood congruent  Associations:  no loose associations  Thought Process:  logical, linear and goal oriented  Thought Content:  no evidence of suicidal ideation or homicidal ideation, no evidence of psychotic thought, no auditory hallucinations present and no visual hallucinations present  Recent and Remote Memory:  Intact to interview. Not formally assessed. No amnesia.  Fund of Knowledge: appropriate  Insight:  good  Judgment:  intact, adequate for safety  Impulse Control:  intact    Suicide Risk Assessment:  Today Marija Langley reports no suicidal ideation. Based on all available evidence including the factors cited above, Marija Langley does not appear to be at imminent risk for self-harm, does not meet criteria for a 72-hr hold, and therefore remains appropriate for ongoing outpatient level of care.  A thorough assessment of risk factors related to suicide and self-harm have been reviewed and are noted above. The patient convincingly denies suicidality on several occasions. Local community safety resources printed and reviewed for patient to use if needed. There was no deceit detected, and the patient presented in a manner that was believable.     DSM5 Diagnosis:  Bipolar II Disorder, in partial remission, most recent episode depressed  Generalized anxiety disorder  Insomnia, unspecified  History of PTSD  History of eating disorder  Polysubstance abuse history-in sustained remission    Medical comorbidities include:   Patient Active Problem List    Diagnosis Date Noted     Bipolar affective disorder, remission status unspecified (H) 05/26/2021     Priority: Medium     Reports she didn't do well on meds in the past.       CATRACHITO (generalized anxiety disorder) 05/26/2021     Priority: Medium      Bulimia 05/26/2021     Priority: Medium     Personal history of urinary tract infection 05/26/2021     Priority: Medium     History of ureter surgery which helped but still gets UTI 1-2 x per year.         Psychosocial & Contextual Factors: see HPI above    Assessment:  From Intake, 7/30/2021:  Marija Langley is a 21-year-old female with past psychiatric history including eating disorder, PTSD, bipolar disorder, anxiety, polysubstance abuse-in sustained remission who presents today for psychiatric evaluation.  Patient's mental health history dates back several years into childhood.  Struggled quite significantly with an eating disorder and substance abuse around 2015.  Patient completed inpatient and outpatient treatment for her eating disorder a couple different programs.  Treatment overall quite successful since she does not struggle much at all with disordered eating.  Will skip a meal every now and again but does not find herself significantly restricting and no longer purges.  No significant active PTSD symptoms.  Patient also not abusing drugs or alcohol for quite some time.  Presents today mainly with concerns for bipolar disorder and a desire to get back on psychiatric medications for her condition.  History of failing multiple antidepressants and doing well on mood stabilizers.  Feels like she did the best on lamotrigine.  Risperidone and Seroquel somewhat helpful for her symptoms but incredibly sedating.  At this time she is agreeable to restarting lamotrigine titration.  Discussed risks and benefits of treatment.  She is also hopeful to engage in individual psychotherapy again.  Has history of using gabapentin as needed for anxiety.  This was also prescribed today.  Will titrate dose as clinically indicated.  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  Propranolol trial started to take as needed for social anxiety symptoms.  Her blood pressure is on the soft  side and so we will start incredibly low at 5-10 mg twice daily as needed.  She was instructed to discontinue if she feels too lightheaded or dizzy.  Hydroxyzine given to take at bedtime for sleep since she has been having some recent sleep struggles.  Hopefully her sleep will improve as her mood is better stabilized on lamotrigine.  No safety concerns today.  No SI.  No drug or alcohol abuse.    9/21/2021:   Patient overall with improvement of mood since starting lamotrigine titration.  Anxiety still high at times but slightly decreased as well.  Still struggling asleep and hydroxyzine not very effective and caused too much excess sedation next day.  Did not notice anything with propranolol and so we will increase the dose to 20 mg up to twice daily as needed for anxiety symptoms.  Tolerating gabapentin well and finding it helpful.  We will continue to optimize gabapentin therapy.  Continue titrating lamotrigine.  We will start trial doxepin for sleep.  Also discussed possibly using doxylamine succinate if doxepin is not helpful.  No safety concerns or SI.  No problematic drug or alcohol use.    11/4/2021:   Patient overall doing quite well.  Mood and anxiety stable.  Sleep also improved.  We will transition her from immediate release propranolol to once daily extended release propranolol since she is taking it twice every day.  This will help simplify her medication regimen.  It will be a slight dose increase so she will watch for any negative side effects as discussed today.  We will also discontinue hydroxyzine and gabapentin to further simplify her medication list.  No acute safety concerns.  No SI.  No problematic drug or alcohol use.    Medication side effects and alternatives were reviewed. Health promotion activities recommended and reviewed today. All questions addressed. Education and counseling completed regarding risks and benefits of medications and psychotherapy options. Recommend therapy for  additional support.     Treatment Plan:    Discontinue gabapentin     Discontinue hydroxyzine    Discontinue propranolol immediate release    Start propranolol extended release 60 mg daily for generalized anxiety disorder.    Continue doxepin 3-10 mg (0.3-1.0 mL) at bedtime as needed for sleep.     Continue lamotrigine 200 mg daily for mood/bipolar disorder.    Continue all other cares per primary care provider.     Continue all other medications as reviewed per electronic medical record today.     Safety plan reviewed. To the Emergency Department as needed or call after hours crisis line at [phone] or [phone]. [place] Crisis Text Line. Text [place] to 061396 or Suicide LifeLine Chat: suicidepreventionKickservline.org/chat    Recommend individual psychotherapy.    Schedule an appointment with me in about 2 months or sooner as needed. Call [facility] at [phone] to schedule.    Follow up with primary care provider as planned or for acute medical concerns.    Call the psychiatric nurse line with medication questions or concerns at [phone].    [employer] may be used to communicate with your provider, but this is not intended to be used for emergencies.    Have previously discussed Lamictal (lamotrigine) can cause serious rashes including Gómez-Nima syndrome which may requiring hospitalization and discontinuation of treatment. If any signs of a rash occur, please see your Primary Care Provider or a dermatologist immediately.     Administrative Billing:   Phone Call/Video Duration: 7 Minutes  Start: 11:11a  Stop: 11:18a    Time spent with patient was 7 minutes and greater than 50% of time or 4 minutes was spent in counseling and coordination of care regarding above diagnoses and treatment plan. Patient with multiple psychiatric diagnoses and multiple medication changes adding to complexity of care.    Patient Status:  Patient will continue to be seen for ongoing consultation and  stabilization.    Signed:   Soheila Argueta DO  [facility]iat[facility]    Disclaimer: This note consists of symbols derived from keyboarding, dictation and/or voice recognition software. As a result, there may be errors in the script that have gone undetected. Please consider this when interpreting information found in this chart.

## 2021-11-10 ENCOUNTER — TELEPHONE (OUTPATIENT)
Dept: PSYCHOLOGY | Facility: CLINIC | Age: 21
End: 2021-11-10

## 2021-11-10 DIAGNOSIS — F41.1 GAD (GENERALIZED ANXIETY DISORDER): ICD-10-CM

## 2021-11-10 RX ORDER — PROPRANOLOL HCL 60 MG
60 CAPSULE, EXTENDED RELEASE 24HR ORAL DAILY
Qty: 90 CAPSULE | Refills: 0 | Status: SHIPPED | OUTPATIENT
Start: 2021-11-10 | End: 2022-01-03

## 2021-11-10 NOTE — TELEPHONE ENCOUNTER
Patient would like to transfer pharmacies to [facility] mail service. Pending prescription to provider.

## 2021-11-10 NOTE — TELEPHONE ENCOUNTER
Pt called stating she was returning a nurse call.  She would like her propranolol prescription sent to [facility] pharmacy please.

## 2021-11-24 NOTE — TELEPHONE ENCOUNTER
Refill request r'cd from [facility] via fax for Lamotrigine denied due to Refill too soon.     Per prev. Encounter requesting all Rx go to [facility], wtr removed other pharmacies from [facility] & replaced.    Fazal Patel RN November 24, 2021 2:24 PM

## 2021-11-26 NOTE — TELEPHONE ENCOUNTER
2nd request, faxed back that it's too soon until January, 2022.    Fazal Patel RN on 11/26/2021 at 2:58 PM

## 2021-11-29 ENCOUNTER — MYC MEDICAL ADVICE (OUTPATIENT)
Dept: PSYCHIATRY | Facility: CLINIC | Age: 21
End: 2021-11-29
Payer: COMMERCIAL

## 2021-11-29 DIAGNOSIS — F31.32 BIPOLAR AFFECTIVE DISORDER, CURRENTLY DEPRESSED, MODERATE (H): ICD-10-CM

## 2021-11-29 RX ORDER — LAMOTRIGINE 200 MG/1
200 TABLET ORAL DAILY
Qty: 90 TABLET | Refills: 0 | Status: SHIPPED | OUTPATIENT
Start: 2021-11-29 | End: 2022-02-11

## 2021-11-29 NOTE — TELEPHONE ENCOUNTER
Date of Last Office Visit: 11/4/21  Date of Next Office Visit: 1/3/21  No shows since last visit: none  Cancellations since last visit: none    Medication requested: lamotrigine 200mg Date last ordered: 10/5/21 Qty: 90 Refills: 0     Lapse in medication adherence greater than 5 days?: no  If yes, call patient and gather details:    Medication refill request verified as identical to current order?: yes  Result of Last DAM, VPA, Li+ Level, CBC, or Carbamazepine Level (at or since last visit): N/A    Last visit treatment plan:   Treatment Plan:    Discontinue gabapentin     Discontinue hydroxyzine    Discontinue propranolol immediate release    Start propranolol extended release 60 mg daily for generalized anxiety disorder.    Continue doxepin 3-10 mg (0.3-1.0 mL) at bedtime as needed for sleep.     Continue lamotrigine 200 mg daily for mood/bipolar disorder.    Continue all other cares per primary care provider.     Continue all other medications as reviewed per electronic medical record today.     Safety plan reviewed. To the Emergency Department as needed or call after hours crisis line at [phone] or [phone]. [place] Crisis Text Line. Text [place] to 048235 or Suicide LifeLine Chat: suicidepreventionlifeline.org/chat    Recommend individual psychotherapy.    Schedule an appointment with me in about 2 months or sooner as needed. Call [facility] at [phone] to schedule.    Follow up with primary care provider as planned or for acute medical concerns.    Call the psychiatric nurse line with medication questions or concerns at [phone].    MyChart may be used to communicate with your provider, but this is not intended to be used for emergencies.      []Medication refilled per  Medication Refill in Ambulatory Care  policy.  [x]Medication unable to be refilled by RN due to criteria not met as indicated below:    []Eligibility - not seen in the last year   []Supervision - no future  appointment   []Compliance - no shows, cancellations or lapse in therapy   []Verification - order discrepancy   []Controlled medication   [x]Medication not included in policy   []90-day supply request   [x]Other - requesting early. Switching to mail order pharmacy

## 2022-01-03 ENCOUNTER — VIRTUAL VISIT (OUTPATIENT)
Dept: PSYCHIATRY | Facility: CLINIC | Age: 22
End: 2022-01-03
Payer: COMMERCIAL

## 2022-01-03 ENCOUNTER — VIRTUAL VISIT (OUTPATIENT)
Dept: BEHAVIORAL HEALTH | Facility: CLINIC | Age: 22
End: 2022-01-03
Payer: COMMERCIAL

## 2022-01-03 DIAGNOSIS — Z86.59 HISTORY OF POSTTRAUMATIC STRESS DISORDER (PTSD): ICD-10-CM

## 2022-01-03 DIAGNOSIS — F41.1 GAD (GENERALIZED ANXIETY DISORDER): ICD-10-CM

## 2022-01-03 DIAGNOSIS — F41.1 GAD (GENERALIZED ANXIETY DISORDER): Primary | ICD-10-CM

## 2022-01-03 DIAGNOSIS — Z86.59 HX OF EATING DISORDER: ICD-10-CM

## 2022-01-03 DIAGNOSIS — F31.9 BIPOLAR AFFECTIVE DISORDER, REMISSION STATUS UNSPECIFIED (H): Primary | ICD-10-CM

## 2022-01-03 DIAGNOSIS — F19.11 SUBSTANCE ABUSE IN REMISSION (H): ICD-10-CM

## 2022-01-03 DIAGNOSIS — G47.00 INSOMNIA, UNSPECIFIED TYPE: ICD-10-CM

## 2022-01-03 DIAGNOSIS — F31.75 BIPOLAR DISORDER, IN PARTIAL REMISSION, MOST RECENT EPISODE DEPRESSED (H): ICD-10-CM

## 2022-01-03 PROCEDURE — 99214 OFFICE O/P EST MOD 30 MIN: CPT | Mod: 95 | Performed by: PSYCHIATRY & NEUROLOGY

## 2022-01-03 PROCEDURE — 99207 PR NO CHARGE LOS: CPT | Performed by: SOCIAL WORKER

## 2022-01-03 RX ORDER — PROPRANOLOL HYDROCHLORIDE 80 MG/1
80 CAPSULE, EXTENDED RELEASE ORAL DAILY
Qty: 90 CAPSULE | Refills: 0 | Status: SHIPPED | OUTPATIENT
Start: 2022-01-03 | End: 2022-03-25

## 2022-01-03 RX ORDER — BUSPIRONE HYDROCHLORIDE 10 MG/1
10 TABLET ORAL 2 TIMES DAILY
Qty: 60 TABLET | Refills: 1 | Status: SHIPPED | OUTPATIENT
Start: 2022-01-03 | End: 2022-02-09

## 2022-01-03 RX ORDER — DOXEPIN HYDROCHLORIDE 10 MG/ML
3-10 SOLUTION ORAL
Qty: 118 ML | Refills: 0 | Status: SHIPPED | OUTPATIENT
Start: 2022-01-03 | End: 2022-01-07

## 2022-01-03 ASSESSMENT — ANXIETY QUESTIONNAIRES
GAD7 TOTAL SCORE: 10
1. FEELING NERVOUS, ANXIOUS, OR ON EDGE: MORE THAN HALF THE DAYS
GAD7 TOTAL SCORE: 10
GAD7 TOTAL SCORE: 10
6. BECOMING EASILY ANNOYED OR IRRITABLE: MORE THAN HALF THE DAYS
2. NOT BEING ABLE TO STOP OR CONTROL WORRYING: SEVERAL DAYS
4. TROUBLE RELAXING: SEVERAL DAYS
7. FEELING AFRAID AS IF SOMETHING AWFUL MIGHT HAPPEN: SEVERAL DAYS
3. WORRYING TOO MUCH ABOUT DIFFERENT THINGS: MORE THAN HALF THE DAYS
7. FEELING AFRAID AS IF SOMETHING AWFUL MIGHT HAPPEN: SEVERAL DAYS
5. BEING SO RESTLESS THAT IT IS HARD TO SIT STILL: SEVERAL DAYS

## 2022-01-03 ASSESSMENT — PATIENT HEALTH QUESTIONNAIRE - PHQ9
SUM OF ALL RESPONSES TO PHQ QUESTIONS 1-9: 6
SUM OF ALL RESPONSES TO PHQ QUESTIONS 1-9: 6
10. IF YOU CHECKED OFF ANY PROBLEMS, HOW DIFFICULT HAVE THESE PROBLEMS MADE IT FOR YOU TO DO YOUR WORK, TAKE CARE OF THINGS AT HOME, OR GET ALONG WITH OTHER PEOPLE: SOMEWHAT DIFFICULT
10. IF YOU CHECKED OFF ANY PROBLEMS, HOW DIFFICULT HAVE THESE PROBLEMS MADE IT FOR YOU TO DO YOUR WORK, TAKE CARE OF THINGS AT HOME, OR GET ALONG WITH OTHER PEOPLE: SOMEWHAT DIFFICULT
SUM OF ALL RESPONSES TO PHQ QUESTIONS 1-9: 6
SUM OF ALL RESPONSES TO PHQ QUESTIONS 1-9: 6

## 2022-01-03 NOTE — PATIENT INSTRUCTIONS
Treatment Plan:    Increase propranolol extended release to 80 mg daily for generalized anxiety disorder. Let me know if you do not tolerate and we need to decrease back to 60 mg dose.     Continue doxepin 3-10 mg (0.3-1.0 mL) at bedtime as needed for sleep.     Continue lamotrigine 200 mg daily for mood/bipolar disorder.    Start BusPar/buspirone for anxiety: take 5 mg twice daily for 1-2 weeks then increase to 10 mg twice daily as tolerated. Let me know if you have severe nausea and need zofran on a temporary basis to get used to the medication.     Continue all other cares per primary care provider.     Continue all other medications as reviewed per electronic medical record today.     Safety plan reviewed. To the Emergency Department as needed or call after hours crisis line at [phone] or [phone]. [place] Crisis Text Line. Text [place] to 799238 or Suicide LifeLine Chat: suicidepreventionlifeline.org/chat    Recommend individual psychotherapy.    Schedule an appointment with me in about 4-6 weeks or sooner as needed. Call [facility] at [phone] to schedule.    Follow up with primary care provider as planned or for acute medical concerns.    Call the psychiatric nurse line with medication questions or concerns at [phone].    [employer] may be used to communicate with your provider, but this is not intended to be used for emergencies.    Have previously discussed Lamictal (lamotrigine) can cause serious rashes including Gómez-Nima syndrome which may requiring hospitalization and discontinuation of treatment. If any signs of a rash occur, please see your Primary Care Provider or a dermatologist immediately.

## 2022-01-03 NOTE — PROGRESS NOTES
"Marija Langley is a 21 year old year old who is being evaluated via a billable video visit.      How would you like to obtain your AVS? MyChart  If you are dropped from the video visit, the video invite should be resent to: Text to cell phone: see Epic  Will anyone else be joining your video visit? No     Telemedicine Visit: The patient's condition can be safely assessed and treated via synchronous audio and visual telemedicine encounter.      Reason for Telemedicine Visit: Covid-19 Pandemic    Originating Site (Patient Location): Patient's home     Distant Site (Provider Location): Provider Remote Setting    Mode of Communication:  Video Conference via [employer]    As the provider I attest to compliance with applicable laws and regulations related to telemedicine.        Outpatient Psychiatric Progress Note    Name: Marija Langley   : 2000                    Primary Care Provider: Cony Bishop PA-C   Therapist: None     PHQ-9 scores:  PHQ-9 SCORE 2021 1/3/2022 1/3/2022   PHQ-9 Total Score MyChart 12 (Moderate depression) - 6 (Mild depression)   PHQ-9 Total Score 12 6 6       CATRACHITO-7 scores:  CATRACHITO-7 SCORE 2021 2021 1/3/2022   Total Score 19 (severe anxiety) 9 (mild anxiety) 10 (moderate anxiety)   Total Score 19 9 10       Patient Identification:  Patient is a 21 year old, partnered / significant other  White Not  or  female  who presents for return visit with me.  Patient is currently a student. Patient attended the phone/video session alone. Patient prefers to be called: \"Marija\".    Interim History:  I last saw Marija Langley for outpatient psychiatry return visit on 2021. During that appointment, we:     Discontinue gabapentin     Discontinue hydroxyzine    Discontinue propranolol immediate release    Start propranolol extended release 60 mg daily for generalized anxiety disorder.    Continue doxepin 3-10 mg (0.3-1.0 mL) at bedtime as needed for sleep. " "    Continue lamotrigine 200 mg daily for mood/bipolar disorder.    1/3: Patient overall doing fairly okay.  Mood remains stable and pretty good.  Anxiety continues to be a concern.  Extended release propranolol 60 mg daily has been helpful and well-tolerated but she does notice some of her symptoms coming back some.  Does often worry quite a bit on a day-to-day basis and is very nervous about going back to school in person.  She reports she dropped out of high school because she was too anxious being in person.  Would like some therapy.  No acute safety concerns or SI.  No problematic drug or alcohol use.  Sleeping well with doxepin.    Per [facility], during today's team-based visit:  Patient shares she feels more fidgety and restless and has noticed a more constant worry overall. She is going back to school in person next week which is increasing her social anxiety symptoms some and isn't looking forward to this. Is wondering if there is a medication that would bemore helpful to manage her symptoms today. Reports she is taking 3mlg Of Doxepin at night, she is sleeping through the night and doesn't feel groggy in the morning. Feels her mood is much more stable and doesn't feel like she has as many \"ups and downs\" anymore. She reports that when she does have change in her mood it feels much more manageable. Reports she has thought of scheduling a few sessions with Lakhwinder Chung PsYD to gain some coping skills, she is aware of how to schedule and was encouraged to do so.  Pt denies suicidal thoughts.    Past Psychiatric Med Trials:  Prozac/fluoxetine-in a \"rage\" on Prozac. Was throwing things and states it ended up on her allergy list. Wellbutrin-wasn't helpful.   Seroquel - hypotension, sedation  Risperidone - \"Sleeping non-stop\" on a month  Lamotrigine - helpful but missed some doses, maybe up to 100 mg daily, last on about 5 years ago  Gabapentin - " anxiety  risperidone-sedation  Naltrexone  Hydroxyzine-not as effective as doxepin for sleep so discontinued    Psychiatric ROS:  Marija Langley reports mood has been: Improved/stable  Anxiety has been: A little worse recently, see HPI above  Sleep has been: Much improved on doxepin  Leanna sxs: None  Psychosis sxs: None  ADHD/ADD sxs: N/A  PTSD sxs: None/stable  PHQ9 and GAD7 scores were reviewed today if completed.   Medication side effects: Denies  Current stressors include: Symptoms and See HPI above  Coping mechanisms and supports include: Family, Hobbies and Friends    Current medications include:   Current Outpatient Medications   Medication Sig     Acetaminophen (TYLENOL PO) Take 325 mg by mouth as needed for mild pain or fever     doxepin (SINEQUAN) 10 MG/ML (HIGH CONC) solution Take 0.3-1 mLs (3-10 mg) by mouth nightly as needed for sleep     lamoTRIgine (LAMICTAL) 200 MG tablet Take 1 tablet (200 mg) by mouth daily     propranolol ER (INDERAL LA) 60 MG 24 hr capsule Take 1 capsule (60 mg) by mouth daily     No current facility-administered medications for this visit.       Past Medical/Surgical History:  Past Medical History:   Diagnosis Date     Anorexia nervosa      Anxiety      Bulimia nervosa      Depressive disorder       has a past medical history of Anorexia nervosa, Anxiety, Bulimia nervosa, and Depressive disorder.    Social History:  Reviewed. No changes to social history except as noted above in HPI.    Vital Signs:   None. This is phone/video visit.     Labs:  Most recent laboratory results reviewed and no new labs.     Review of Systems:  10 systems (general, cardiovascular, respiratory, eyes, ENT, endocrine, GI, , M/S, neurological) were reviewed. Most pertinent finding(s) is/are: denies fever, cough, headaches, shortness of breath, chest pain, N/V, constipation/diarrhea, trouble urinating, aches and pains. The remaining systems are all unremarkable.    Mental Status Examination (limited as  this is by phone/video):  Appearance: Awake, alert, appears stated age, no acute distress, well-groomed   Attitude:  cooperative, pleasant   Motor: No gross abnormalities observed via video, not formally tested   Oriented to:  person, place, time, and situation  Attention Span and Concentration:  normal  Speech:  clear, coherent, regular rate, rhythm, and volume  Language: intact  Mood: Pretty good  Affect:  appropriate and in normal range and mood congruent  Associations:  no loose associations  Thought Process:  logical, linear and goal oriented  Thought Content:  no evidence of suicidal ideation or homicidal ideation, no evidence of psychotic thought, no auditory hallucinations present and no visual hallucinations present  Recent and Remote Memory:  Intact to interview. Not formally assessed. No amnesia.  Fund of Knowledge: appropriate  Insight:  good  Judgment:  intact, adequate for safety  Impulse Control:  intact    Suicide Risk Assessment:  Today Marija Langley reports no suicidal ideation. Based on all available evidence including the factors cited above, Marija Langley does not appear to be at imminent risk for self-harm, does not meet criteria for a 72-hr hold, and therefore remains appropriate for ongoing outpatient level of care.  A thorough assessment of risk factors related to suicide and self-harm have been reviewed and are noted above. The patient convincingly denies suicidality on several occasions. Local community safety resources printed and reviewed for patient to use if needed. There was no deceit detected, and the patient presented in a manner that was believable.     DSM5 Diagnosis:  Bipolar II Disorder, in partial remission, most recent episode depressed  Generalized anxiety disorder  Insomnia, unspecified  History of PTSD  History of eating disorder  Polysubstance abuse history-in sustained remission    Medical comorbidities include:   Patient Active Problem List    Diagnosis Date Noted      Bipolar affective disorder, remission status unspecified (H) 05/26/2021     Priority: Medium     Reports she didn't do well on meds in the past.       CATRACHITO (generalized anxiety disorder) 05/26/2021     Priority: Medium     Bulimia 05/26/2021     Priority: Medium     Personal history of urinary tract infection 05/26/2021     Priority: Medium     History of ureter surgery which helped but still gets UTI 1-2 x per year.         Psychosocial & Contextual Factors: see HPI above    Assessment:  From Intake, 7/30/2021:  Marija Langley is a 21-year-old female with past psychiatric history including eating disorder, PTSD, bipolar disorder, anxiety, polysubstance abuse-in sustained remission who presents today for psychiatric evaluation.  Patient's mental health history dates back several years into childhood.  Struggled quite significantly with an eating disorder and substance abuse around 2015.  Patient completed inpatient and outpatient treatment for her eating disorder a couple different programs.  Treatment overall quite successful since she does not struggle much at all with disordered eating.  Will skip a meal every now and again but does not find herself significantly restricting and no longer purges.  No significant active PTSD symptoms.  Patient also not abusing drugs or alcohol for quite some time.  Presents today mainly with concerns for bipolar disorder and a desire to get back on psychiatric medications for her condition.  History of failing multiple antidepressants and doing well on mood stabilizers.  Feels like she did the best on lamotrigine.  Risperidone and Seroquel somewhat helpful for her symptoms but incredibly sedating.  At this time she is agreeable to restarting lamotrigine titration.  Discussed risks and benefits of treatment.  She is also hopeful to engage in individual psychotherapy again.  Has history of using gabapentin as needed for anxiety.  This was also prescribed today.  Will titrate dose as  clinically indicated.  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  Propranolol trial started to take as needed for social anxiety symptoms.  Her blood pressure is on the soft side and so we will start incredibly low at 5-10 mg twice daily as needed.  She was instructed to discontinue if she feels too lightheaded or dizzy.  Hydroxyzine given to take at bedtime for sleep since she has been having some recent sleep struggles.  Hopefully her sleep will improve as her mood is better stabilized on lamotrigine.  No safety concerns today.  No SI.  No drug or alcohol abuse.    9/21/2021:   Patient overall with improvement of mood since starting lamotrigine titration.  Anxiety still high at times but slightly decreased as well.  Still struggling asleep and hydroxyzine not very effective and caused too much excess sedation next day.  Did not notice anything with propranolol and so we will increase the dose to 20 mg up to twice daily as needed for anxiety symptoms.  Tolerating gabapentin well and finding it helpful.  We will continue to optimize gabapentin therapy.  Continue titrating lamotrigine.  We will start trial doxepin for sleep.  Also discussed possibly using doxylamine succinate if doxepin is not helpful.  No safety concerns or SI.  No problematic drug or alcohol use.    11/4/2021:   Patient overall doing quite well.  Mood and anxiety stable.  Sleep also improved.  We will transition her from immediate release propranolol to once daily extended release propranolol since she is taking it twice every day.  This will help simplify her medication regimen.  It will be a slight dose increase so she will watch for any negative side effects as discussed today.  We will also discontinue hydroxyzine and gabapentin to further simplify her medication list.  No acute safety concerns.  No SI.  No problematic drug or alcohol use.    1/3/2021:  Patient overall doing quite well for the most  part except for some ongoing pretty intense daily anxiety.  Extended release propranolol has been helpful but she continues to have some significant physical symptoms of anxiety.  We will increase extended release propranolol to 80 mg daily.  She will watch for feeling too lightheaded or dizzy.  We will also start BuSpar/buspirone to help with anxiety.  Mood has been stable.  Encouraged individual psychotherapy.  No acute safety concerns.  No SI.  No problematic drug or alcohol use.    Medication side effects and alternatives were reviewed. Health promotion activities recommended and reviewed today. All questions addressed. Education and counseling completed regarding risks and benefits of medications and psychotherapy options. Recommend therapy for additional support.     Treatment Plan:    Increase propranolol extended release to 80 mg daily for generalized anxiety disorder. Let me know if you do not tolerate and we need to decrease back to 60 mg dose.     Continue doxepin 3-10 mg (0.3-1.0 mL) at bedtime as needed for sleep.     Continue lamotrigine 200 mg daily for mood/bipolar disorder.    Start BusPar/buspirone for anxiety: take 5 mg twice daily for 1-2 weeks then increase to 10 mg twice daily as tolerated. Let me know if you have severe nausea and need zofran on a temporary basis to get used to the medication.     Continue all other cares per primary care provider.     Continue all other medications as reviewed per electronic medical record today.     Safety plan reviewed. To the Emergency Department as needed or call after hours crisis line at [phone] or [phone]. [place] Crisis Text Line. Text [place] to 291721 or Suicide LifeLine Chat: suicidepreventionlifeline.org/chat    Recommend individual psychotherapy.    Schedule an appointment with me in about 4-6 weeks or sooner as needed. Call [facility] at [phone] to schedule.    Follow up with primary care provider as planned or for  acute medical concerns.    Call the psychiatric nurse line with medication questions or concerns at [phone].    MyChart may be used to communicate with your provider, but this is not intended to be used for emergencies.    Have previously discussed Lamictal (lamotrigine) can cause serious rashes including Gómez-Nima syndrome which may requiring hospitalization and discontinuation of treatment. If any signs of a rash occur, please see your Primary Care Provider or a dermatologist immediately.     Administrative Billing:   Phone Call/Video Duration: 12 Minutes  Start: 8:04a  Stop: 8:16a    Time spent with patient was 12 minutes and greater than 50% of time or 7 minutes was spent in counseling and coordination of care regarding above diagnoses and treatment plan.     Patient Status:  Patient will continue to be seen for ongoing consultation and stabilization.    Signed:   Soheila Argueta DO  [facility]    Disclaimer: This note consists of symbols derived from keyboarding, dictation and/or voice recognition software. As a result, there may be errors in the script that have gone undetected. Please consider this when interpreting information found in this chart.

## 2022-01-03 NOTE — PROGRESS NOTES
[facility] ([facility])  January 3 , 2022    Behavioral Health Clinician Progress Note    Patient Name: Marija Langley      Telemedicine Visit: The patient's condition can be safely assessed and treated via synchronous audio and visual telemedicine encounter.      Reason for Telemedicine Visit: Services only offered telehealth    Originating Site (Patient Location): Patient's home    Distant Site (Provider Location): Provider Remote Setting- Home Office    Consent:  The patient/guardian has verbally consented to: the potential risks and benefits of telemedicine (video visit) versus in person care; bill my insurance or make self-payment for services provided; and responsibility for payment of non-covered services.     Mode of Communication:  Video Conference via [employer]    As the provider I attest to compliance with applicable laws and regulations related to telemedicine.         Service Type:  Individual      Service Location:   Face to Face in Home / Community     Session Start Time: 7:30am  Session End Time: 7:39am      Session Length: 9 minutes     Attendees: Client    Visit Activities (Refresh list every visit): [facility] Only    Diagnostic Assessment Date: 7/30/21 Lakhwinder Chung  See Flowsheets for today's PHQ-9 and CATRACHITO-7 results  Previous PHQ-9:   PHQ-9 SCORE 9/21/2021 1/3/2022 1/3/2022   PHQ-9 Total Score MyChart 12 (Moderate depression) - 6 (Mild depression)   PHQ-9 Total Score 12 6 6       Previous CATRACHITO-7:   CATRACHITO-7 SCORE 7/29/2021 9/21/2021 1/3/2022   Total Score 19 (severe anxiety) 9 (mild anxiety) 10 (moderate anxiety)   Total Score 19 9 10       WHODAS  WHODAS 2.0 Total Score 7/29/2021   Total Score 33   Total Score MyChart 33        CAGE  CAGE-AID Flowsheet 7/29/2021   Have you ever felt you should Cut down on your drinking or drug use? 0   Have people Annoyed you by criticizing your drinking or drug use? 0   Have you ever felt bad or Guilty about your drinking or drug use? 0   Have you  "ever had a drink or used drugs first thing in the morning to steady your nerves or to get rid of a hangover? (Eye opener) 0   CAGE-AID SCORE 0   1. Have you felt you ought to cut down on your drinking or drug use? No   2. Have people annoyed you by criticizing your drinking or drug use? No   3. Have you felt bad or guilty about your drinking or drug use? No   4. Have you ever had a drink or used drugs first thing in the morning to steady your nerves or to get rid of a hangover (eye opener)? No   CAGE-AID SCORE 0 (A total score of 2 or greater is considered clinically significant)         DATA  Extended Session (60+ minutes): No  Interactive Complexity: No  Crisis: No    Medication Compliance:  Yes      Chemical Use Review:   Substance Use: Chemical use reviewed, no active concerns identified      Tobacco Use: No current tobacco use.      Current Stressors / Issues:    Patient shares she feels more fidgety and restless and has noticed a more constant worry overall. She is going back to school in person next week which is increasing her social anxiety symptoms some and isn't looking forward to this. Is wondering if there is a medication that would bemore helpful to manage her symptoms today. Reports she is taking 3mlg Of Doxepin at night, she is sleeping through the night and doesn't feel groggy in the morning. Feels her mood is much more stable and doesn't feel like she has as many \"ups and downs\" anymore. She reports that when she does have change in her mood it feels much more manageable. Reports she has thought of scheduling a few sessions with Lakhwinder Chung PsYD to gain some coping skills, she is aware of how to schedule and was encouraged to do so.  Pt denies suicidal thoughts.        Progress on Treatment Objective(s) / Homework:  Minimal progress - ACTION (Actively working towards change); Intervened by reinforcing change plan / affirming steps taken    Motivational Interviewing    MI Intervention: Expressed " Empathy/Understanding, Supported Autonomy, Collaboration, Evocation, Permission to raise concern or advise, Open-ended questions and Reflections: simple and complex     Change Talk Expressed by the Patient: Desire to change Ability to change Reasons to change Need to change Committment to change    Provider Response to Change Talk: E - Evoked more info from patient about behavior change, A - Affirmed patient's thoughts, decisions, or attempts at behavior change, R - Reflected patient's change talk and S - Summarized patient's change talk statements    Also provided psychoeducation about behavioral health condition, symptoms, and treatment options      Review of Symptoms per patient report:  Depression: Change in sleep, Lack of interest, Excessive or inappropriate guilt, Change in energy level, Difficulties concentrating, Ruminations, Irritability and Feeling sad, down, or depressed  Leanna:  No Symptoms  Psychosis: No Symptoms  Anxiety: Excessive worry, Nervousness, Physical complaints, such as headaches, stomachaches, muscle tension, Psychomotor agitation, Ruminations, Poor concentration and Irritability  Panic:  No symptoms  Post Traumatic Stress Disorder:  No Symptoms   Eating Disorder: Restriction and Purging  ADD / ADHD:  No symptoms  Conduct Disorder: No symptoms  Autism Spectrum Disorder: No symptoms  Obsessive Compulsive Disorder: No Symptoms      Changes in Health Issues:   None reported    Assessment: Current Emotional / Mental Status (status of significant symptoms):  Risk status (Self / Other harm or suicidal ideation)  Patient has had a history of suicidal ideation: in the pasty and suicide attempts: as a teenager  Patient denies current fears or concerns for personal safety.  Patient denies current or recent suicidal ideation or behaviors.  Patient denies current or recent homicidal ideation or behaviors.  Patient denies current or recent self injurious behavior or ideation.  Patient denies other safety  concerns.  A safety and risk management plan has been developed including: Patient consented to co-developed safety plan.  A safety and risk management plan was completed.  Patient agreed to use safety plan should any safety concerns arise.  A copy was given to the patient.    Appearance:   Appropriate   Eye Contact:   Good   Psychomotor Behavior: Normal   Attitude:   Cooperative   Orientation:   All  Speech   Rate / Production: Normal    Volume:  Normal   Mood:    Normal  Affect:    Appropriate   Thought Content:  Clear   Thought Form:  Coherent  Logical   Insight:    Good        Diagnoses:  1. Bipolar affective disorder, remission status unspecified (H)    2. CATRACHITO (generalized anxiety disorder)        Collateral Reports Completed:  Communicated with:     Plan: (Homework, other):  Patient was given information about behavioral services and encouraged to schedule a follow up appointment with the clinic [facility] in conjunction with next [facility] appointment.  She was also given information about mental health symptoms and treatment options .  CD Recommendations: No indications of CD issues.  LACI Pittman LICSW  January 3rd, 2022    Answers for HPI/ROS submitted by the patient on 1/3/2022  If you checked off any problems, how difficult have these problems made it for you to do your work, take care of things at home, or get along with other people?: Somewhat difficult  PHQ9 TOTAL SCORE: 6

## 2022-01-04 ENCOUNTER — TELEPHONE (OUTPATIENT)
Dept: PSYCHOLOGY | Facility: CLINIC | Age: 22
End: 2022-01-04
Payer: COMMERCIAL

## 2022-01-04 ASSESSMENT — ANXIETY QUESTIONNAIRES: GAD7 TOTAL SCORE: 10

## 2022-01-04 ASSESSMENT — PATIENT HEALTH QUESTIONNAIRE - PHQ9
SUM OF ALL RESPONSES TO PHQ QUESTIONS 1-9: 6
SUM OF ALL RESPONSES TO PHQ QUESTIONS 1-9: 6

## 2022-01-04 NOTE — TELEPHONE ENCOUNTER
"Have pt try 5 mg using dropper included. She can \"guestimate\" lower doses if 5 mg feels like it is too much. Thanks    Bundle message sent to the patient with provider's directives noted above.  "

## 2022-01-04 NOTE — TELEPHONE ENCOUNTER
"Hi Dr Argueta -  We received a fax regarding the medication doxepin (SINEQUAN) 10 MG/ML (HIGH CONC) solution  According to the pharmacist I spoke with, This medication has a 'dropper' that is marked with 5 mg, 10 mg, 20 mg, and 25 mg. Therefor the patient would not be able to accurately dose the 3 mg or 4mg based on your order of \"Take 0.3-1 mLs (3-10 mg) by mouth nightly as needed for sleep - Oral\"  I asked the pharmacist if they had a different dropper to give the patient and they stated that they do not and it comes with the one stated above.  Please advise.    Thank you  Betsy Lock RN on 1/4/2022 at 11:09 AM      "

## 2022-01-04 NOTE — CONFIDENTIAL NOTE
"Have pt try 5 mg using dropper included. She can \"guestimate\" lower doses if 5 mg feels like it is too much. Thanks!   "

## 2022-01-25 ENCOUNTER — VIRTUAL VISIT (OUTPATIENT)
Dept: PSYCHOLOGY | Facility: CLINIC | Age: 22
End: 2022-01-25
Payer: COMMERCIAL

## 2022-01-25 DIAGNOSIS — G47.00 INSOMNIA, UNSPECIFIED TYPE: ICD-10-CM

## 2022-01-25 DIAGNOSIS — F31.9 BIPOLAR AFFECTIVE DISORDER, REMISSION STATUS UNSPECIFIED (H): Primary | ICD-10-CM

## 2022-01-25 DIAGNOSIS — F41.1 GAD (GENERALIZED ANXIETY DISORDER): ICD-10-CM

## 2022-01-25 PROCEDURE — 90837 PSYTX W PT 60 MINUTES: CPT | Mod: 95 | Performed by: PSYCHOLOGIST

## 2022-01-25 NOTE — PROGRESS NOTES
Collaborative Care Psychiatry Service (CCPS)  January 25, 2022      Behavioral Health Clinician Progress Note    Patient Name: Marija Langley      Telemedicine Visit: The patient's condition can be safely assessed and treated via synchronous audio and visual telemedicine encounter.      Reason for Telemedicine Visit: Services only offered telehealth    Originating Site (Patient Location): Patient's home    Distant Site (Provider Location): Provider Remote Setting- Home Office    Consent:  The patient/guardian has verbally consented to: the potential risks and benefits of telemedicine (video visit) versus in person care; bill my insurance or make self-payment for services provided; and responsibility for payment of non-covered services.     Mode of Communication:  Video Conference via [employer]    As the provider I attest to compliance with applicable laws and regulations related to telemedicine.         Service Type:  Individual      Session Start Time: 01:00pm  Session End Time: 01:55pm      Session Length: 53 - 60      Attendees: Client    Visit Activities (Refresh list every visit): [facility] Only    Diagnostic Assessment Date: 7/30/21  See Flowsheets for today's PHQ-9 and CATRACHITO-7 results  Previous PHQ-9:   PHQ-9 SCORE 9/21/2021 1/3/2022 1/3/2022   PHQ-9 Total Score MyChart 12 (Moderate depression) - 6 (Mild depression)   PHQ-9 Total Score 12 6 6       Previous CATRACHITO-7:   CATRACHITO-7 SCORE 7/29/2021 9/21/2021 1/3/2022   Total Score 19 (severe anxiety) 9 (mild anxiety) 10 (moderate anxiety)   Total Score 19 9 10       WHODAS  WHODAS 2.0 Total Score 7/29/2021   Total Score 33   Total Score MyChart 33        CAGE  CAGE-AID Flowsheet 7/29/2021   Have you ever felt you should Cut down on your drinking or drug use? 0   Have people Annoyed you by criticizing your drinking or drug use? 0   Have you ever felt bad or Guilty about your drinking or drug use? 0   Have you ever had a drink or used drugs first thing in the morning to steady  your nerves or to get rid of a hangover? (Eye opener) 0   CAGE-AID SCORE 0   1. Have you felt you ought to cut down on your drinking or drug use? No   2. Have people annoyed you by criticizing your drinking or drug use? No   3. Have you felt bad or guilty about your drinking or drug use? No   4. Have you ever had a drink or used drugs first thing in the morning to steady your nerves or to get rid of a hangover (eye opener)? No   CAGE-AID SCORE 0 (A total score of 2 or greater is considered clinically significant)         DATA  Extended Session (60+ minutes): No  Interactive Complexity: No  Crisis: No    Medication Compliance:  Yes      Chemical Use Review:   Substance Use: Chemical use reviewed, no active concerns identified      Tobacco Use: No current tobacco use.      Current Stressors / Issues:  Marija spoke about her struggles with anxiety, how she has historically avoided therapy, and her desire to learn different ways to cope with anxiety. She identified her tendency to avoid anxiety provoking situations though she would ultimately like to do more in her life. she agreed to work on learning some coping skills for her anxiety. She was introduced to Acceptance and Commitment Therapy (ACT), and treatment plan for a brief course of therapy was established.       Progress on Treatment Objective(s) / Homework:  New Objective established this session - ACTION (Actively working towards change); Intervened by reinforcing change plan / affirming steps taken    Motivational Interviewing    MI Intervention: Expressed Empathy/Understanding, Supported Autonomy, Collaboration, Evocation, Permission to raise concern or advise, Open-ended questions and Reflections: simple and complex     Change Talk Expressed by the Patient: Activation Taking steps    Provider Response to Change Talk: E - Evoked more info from patient about behavior change, A - Affirmed patient's thoughts, decisions, or attempts at behavior change, R -  Reflected patient's change talk and S - Summarized patient's change talk statements    Also provided psychoeducation about behavioral health condition, symptoms, and treatment options      Review of Symptoms per patient report as of 07/30/2021:  Depression: Change in sleep, Lack of interest, Excessive or inappropriate guilt, Change in energy level, Difficulties concentrating, Ruminations, Irritability and Feeling sad, down, or depressed  Leanna:  No Symptoms  Psychosis: No Symptoms  Anxiety: Excessive worry, Nervousness, Physical complaints, such as headaches, stomachaches, muscle tension, Psychomotor agitation, Ruminations, Poor concentration and Irritability  Panic:  No symptoms  Post Traumatic Stress Disorder:  No Symptoms   Eating Disorder: Restriction and Purging  ADD / ADHD:  No symptoms  Conduct Disorder: No symptoms  Autism Spectrum Disorder: No symptoms  Obsessive Compulsive Disorder: No Symptoms      Changes in Health Issues:   None reported    Assessment: Current Emotional / Mental Status (status of significant symptoms):  Risk status (Self / Other harm or suicidal ideation)  Patient has had a history of suicidal ideation: in the pasty and suicide attempts: as a teenager  Patient denies current fears or concerns for personal safety.  Patient denies current or recent suicidal ideation or behaviors.  Patient denies current or recent homicidal ideation or behaviors.  Patient denies current or recent self injurious behavior or ideation.  Patient denies other safety concerns.  A safety and risk management plan has been developed including: Patient consented to co-developed safety plan.  A safety and risk management plan was completed.  Patient agreed to use safety plan should any safety concerns arise.  A copy was given to the patient.    Appearance:   Appropriate   Eye Contact:   Good   Psychomotor Behavior: Normal   Attitude:   Cooperative   Orientation:   All  Speech   Rate / Production: Normal     Volume:  Normal   Mood:    Anxious   Affect:    Appropriate   Thought Content:  Clear   Thought Form:  Coherent  Logical   Insight:    Good        Diagnoses:  1. Bipolar affective disorder, remission status unspecified (H)    2. CATRACHITO (generalized anxiety disorder)    3. Insomnia, unspecified type        Collateral Reports Completed:  Not Applicable    Plan: (Homework, other):  Patient was given information about behavioral services and encouraged to schedule a follow up in 2 weeks.  She was also given information about mental health symptoms and treatment options .  CD Recommendations: No indications of CD issues.      Lakhwinder Chung PsyD, LP      Jerry Chung PsyD  January 25, 2022      ______________________________________________________________________    Integrated Behavioral Health Treatment Plan    Patient's Name: Marija Langley  YOB: 2000    Date: January 25, 2022    DSM-V Diagnoses: 296.41 Bipolar I Disorder Current or Most Recent Episode Manic, Mild  or 300.23 (F40.10) Social Anxiety Disorder  300.02 (F41.1) Generalized Anxiety Disorder  Psychosocial / Contextual Factors: struggling with attending class, work    Referral / Collaboration:  Referral to another professional/service is not indicated at this time..    Anticipated number of session or this episode of care: 6-8    MeasurableTreatment Goal(s) related to diagnosis / functional impairment(s)  Goal 1: Patient will report increased value directed behaviors    I will know I've met my goal when I can join groups and talk to people more easily.       Objective #A (Patient Action)                          Patient will identify the cost of control/avoidance behaviors as a coping strategy.  Status: New - Date: 01/25/2022     Intervention(s)  [facility] will utilize exercises/worksheets to teach initial concepts (e.g., Choice Point, Life Turnaround, D.O.T.S.)     Objective #B  Patient will practice mindfulness skills.  Status: New - Date:  01/25/2022    Intervention(s)  [facility] will teach various mindfulness techniques (e.g., Notice 5 Things, 10 Mindful breaths, Leaves on a Stream)    Objective #C  Patient will clarify personal values for her life that positively impact behavior choices.  Status: New - Date: 01/25/2022     Intervention(s)  [facility] will help clarify values via exercises (e.g., values card sort) and worksheets (e.g., Jacquie)     Objective #D  Patient will report increased value determined behaviors with decreased negative impact of symptoms.  Status: New - Date: 01/25/2022     Intervention(s)  [facility] will assign goal setting/committed action homework in service of values      Patient has reviewed and agreed to the above plan.      Jerry Chung PsyD  January 25, 2022

## 2022-02-03 NOTE — TELEPHONE ENCOUNTER
"Refill request r'cd from [facility] via fax for Bispirone 10 mg and Doxepin Concentrate denied due to request too soon. Faxed \"not authorized\" back to pharmacy.    Betsy Lock RN February 3, 2022 10:57 AM    "

## 2022-02-08 ENCOUNTER — VIRTUAL VISIT (OUTPATIENT)
Dept: PSYCHOLOGY | Facility: CLINIC | Age: 22
End: 2022-02-08
Payer: COMMERCIAL

## 2022-02-08 DIAGNOSIS — F41.1 GAD (GENERALIZED ANXIETY DISORDER): ICD-10-CM

## 2022-02-08 DIAGNOSIS — F31.9 BIPOLAR AFFECTIVE DISORDER, REMISSION STATUS UNSPECIFIED (H): Primary | ICD-10-CM

## 2022-02-08 DIAGNOSIS — G47.00 INSOMNIA, UNSPECIFIED TYPE: ICD-10-CM

## 2022-02-08 PROCEDURE — 90837 PSYTX W PT 60 MINUTES: CPT | Mod: 95 | Performed by: PSYCHOLOGIST

## 2022-02-08 ASSESSMENT — PATIENT HEALTH QUESTIONNAIRE - PHQ9
SUM OF ALL RESPONSES TO PHQ QUESTIONS 1-9: 9
SUM OF ALL RESPONSES TO PHQ QUESTIONS 1-9: 9
10. IF YOU CHECKED OFF ANY PROBLEMS, HOW DIFFICULT HAVE THESE PROBLEMS MADE IT FOR YOU TO DO YOUR WORK, TAKE CARE OF THINGS AT HOME, OR GET ALONG WITH OTHER PEOPLE: SOMEWHAT DIFFICULT

## 2022-02-08 NOTE — TELEPHONE ENCOUNTER
"Refill request r'cd from [facility] via fax for Lamictal 200 denied due to Req too soon. Faxed \"not authorized\" back to pharmacy.    Betsy Lock RN February 8, 2022 9:42   "

## 2022-02-08 NOTE — PROGRESS NOTES
Collaborative Care Psychiatry Service (CCPS)  February 8, 2022    Behavioral Health Clinician Progress Note    Patient Name: Marija Langley      Telemedicine Visit: The patient's condition can be safely assessed and treated via synchronous audio and visual telemedicine encounter.      Reason for Telemedicine Visit: Services only offered telehealth    Originating Site (Patient Location): Patient's home    Distant Site (Provider Location): Provider Remote Setting- Home Office    Consent:  The patient/guardian has verbally consented to: the potential risks and benefits of telemedicine (video visit) versus in person care; bill my insurance or make self-payment for services provided; and responsibility for payment of non-covered services.     Mode of Communication:  Video Conference via [employer]    As the provider I attest to compliance with applicable laws and regulations related to telemedicine.         Service Type:  Individual      Session Start Time: 10:00am  Session End Time: 10:55am      Session Length: 53 - 60      Attendees: Client    Visit Activities (Refresh list every visit): [facility] Only    Diagnostic Assessment Date: 7/30/21  See Flowsheets for today's PHQ-9 and CATRACHITO-7 results  Previous PHQ-9:   PHQ-9 SCORE 1/3/2022 1/3/2022 2/8/2022   PHQ-9 Total Score MyChart - 6 (Mild depression) 9 (Mild depression)   PHQ-9 Total Score 6 6 9       Previous CATRACHITO-7:   CATRACHITO-7 SCORE 7/29/2021 9/21/2021 1/3/2022   Total Score 19 (severe anxiety) 9 (mild anxiety) 10 (moderate anxiety)   Total Score 19 9 10       WHODAS  WHODAS 2.0 Total Score 7/29/2021   Total Score 33   Total Score MyChart 33        CAGE  CAGE-AID Flowsheet 7/29/2021   Have you ever felt you should Cut down on your drinking or drug use? 0   Have people Annoyed you by criticizing your drinking or drug use? 0   Have you ever felt bad or Guilty about your drinking or drug use? 0   Have you ever had a drink or used drugs first thing in the morning to steady your  nerves or to get rid of a hangover? (Eye opener) 0   CAGE-AID SCORE 0   1. Have you felt you ought to cut down on your drinking or drug use? No   2. Have people annoyed you by criticizing your drinking or drug use? No   3. Have you felt bad or guilty about your drinking or drug use? No   4. Have you ever had a drink or used drugs first thing in the morning to steady your nerves or to get rid of a hangover (eye opener)? No   CAGE-AID SCORE 0 (A total score of 2 or greater is considered clinically significant)         DATA  Extended Session (60+ minutes): No  Interactive Complexity: No  Crisis: No    Medication Compliance:  Yes      Chemical Use Review:   Substance Use: Chemical use reviewed, no active concerns identified      Tobacco Use: No current tobacco use.      Current Stressors / Issues:  Marija spoke about what she had observed regarding her thoughts and feelings. She identified a theme to her thoughts about not wanting to inconvenience others and people having a poor impression of her. We worked through those thoughts and she was taught some skills to better manage them and having less intrusion onto her day to day life.      Progress on Treatment Objective(s) / Homework:   Satisfactory progress - ACTION (Actively working towards change); Intervened by reinforcing change plan / affirming steps taken    Motivational Interviewing    MI Intervention: Expressed Empathy/Understanding, Supported Autonomy, Collaboration, Evocation, Permission to raise concern or advise, Open-ended questions and Reflections: simple and complex     Change Talk Expressed by the Patient: Activation Taking steps    Provider Response to Change Talk: E - Evoked more info from patient about behavior change, A - Affirmed patient's thoughts, decisions, or attempts at behavior change, R - Reflected patient's change talk and S - Summarized patient's change talk statements    Also provided psychoeducation about behavioral health condition,  symptoms, and treatment options      Review of Symptoms per patient report as of 07/30/2021:  Depression: Change in sleep, Lack of interest, Excessive or inappropriate guilt, Change in energy level, Difficulties concentrating, Ruminations, Irritability and Feeling sad, down, or depressed  Leanna:  No Symptoms  Psychosis: No Symptoms  Anxiety: Excessive worry, Nervousness, Physical complaints, such as headaches, stomachaches, muscle tension, Psychomotor agitation, Ruminations, Poor concentration and Irritability  Panic:  No symptoms  Post Traumatic Stress Disorder:  No Symptoms   Eating Disorder: Restriction and Purging  ADD / ADHD:  No symptoms  Conduct Disorder: No symptoms  Autism Spectrum Disorder: No symptoms  Obsessive Compulsive Disorder: No Symptoms      Changes in Health Issues:   None reported    Assessment: Current Emotional / Mental Status (status of significant symptoms):  Risk status (Self / Other harm or suicidal ideation)  Patient has had a history of suicidal ideation: in the pasty and suicide attempts: as a teenager  Patient denies current fears or concerns for personal safety.  Patient denies current or recent suicidal ideation or behaviors.  Patient denies current or recent homicidal ideation or behaviors.  Patient denies current or recent self injurious behavior or ideation.  Patient denies other safety concerns.  A safety and risk management plan has been developed including: Patient consented to co-developed safety plan.  A safety and risk management plan was completed.  Patient agreed to use safety plan should any safety concerns arise.  A copy was given to the patient.    Appearance:   Appropriate   Eye Contact:   Good   Psychomotor Behavior: Normal   Attitude:   Cooperative   Orientation:   All  Speech   Rate / Production: Normal    Volume:  Normal   Mood:    Anxious   Affect:    Appropriate   Thought Content:  Clear   Thought Form:  Coherent  Logical   Insight:    Good        Diagnoses:  1.  Bipolar affective disorder, remission status unspecified (H)    2. CATRACHITO (generalized anxiety disorder)    3. Insomnia, unspecified type        Collateral Reports Completed:  Not Applicable    Plan: (Homework, other):  Patient was given information about behavioral services and encouraged to schedule a follow up in 2 weeks.  She was also given information about mental health symptoms and treatment options .  CD Recommendations: No indications of CD issues.      Lakhwinder Chung PsyD, LP      Jerry Chung PsyD  January 25, 2022      ______________________________________________________________________    Integrated Behavioral Health Treatment Plan    Patient's Name: Marija Langley  YOB: 2000    Date: January 25, 2022    DSM-V Diagnoses: 296.41 Bipolar I Disorder Current or Most Recent Episode Manic, Mild  or 300.23 (F40.10) Social Anxiety Disorder  300.02 (F41.1) Generalized Anxiety Disorder  Psychosocial / Contextual Factors: struggling with attending class, work    Referral / Collaboration:  Referral to another professional/service is not indicated at this time..    Anticipated number of session or this episode of care: 6-8    MeasurableTreatment Goal(s) related to diagnosis / functional impairment(s)  Goal 1: Patient will report increased value directed behaviors    I will know I've met my goal when I can join groups and talk to people more easily.       Objective #A (Patient Action)                          Patient will identify the cost of control/avoidance behaviors as a coping strategy.  Status: New - Date: 01/25/2022     Intervention(s)  [facility] will utilize exercises/worksheets to teach initial concepts (e.g., Choice Point, Life Turnaround, D.O.T.S.)     Objective #B  Patient will practice mindfulness skills.  Status: New - Date: 01/25/2022    Intervention(s)  [facility] will teach various mindfulness techniques (e.g., Notice 5 Things, 10 Mindful breaths, Leaves on a Stream)    Objective #C  Patient will  clarify personal values for her life that positively impact behavior choices.  Status: New - Date: 01/25/2022     Intervention(s)  [facility] will help clarify values via exercises (e.g., values card sort) and worksheets (e.g., Jacquie)     Objective #D  Patient will report increased value determined behaviors with decreased negative impact of symptoms.  Status: New - Date: 01/25/2022     Intervention(s)  [facility] will assign goal setting/committed action homework in service of values      Patient has reviewed and agreed to the above plan.      Jerry Chung PsyD  January 25, 2022

## 2022-02-09 DIAGNOSIS — F31.32 BIPOLAR AFFECTIVE DISORDER, CURRENTLY DEPRESSED, MODERATE (H): ICD-10-CM

## 2022-02-09 DIAGNOSIS — F41.1 GAD (GENERALIZED ANXIETY DISORDER): ICD-10-CM

## 2022-02-09 DIAGNOSIS — G47.00 INSOMNIA, UNSPECIFIED TYPE: ICD-10-CM

## 2022-02-09 RX ORDER — DOXEPIN HYDROCHLORIDE 10 MG/ML
5-10 SOLUTION ORAL
Qty: 118 ML | Refills: 0 | Status: SHIPPED | OUTPATIENT
Start: 2022-02-09 | End: 2022-02-11

## 2022-02-09 RX ORDER — BUSPIRONE HYDROCHLORIDE 10 MG/1
10 TABLET ORAL 2 TIMES DAILY
Qty: 60 TABLET | Refills: 1 | Status: SHIPPED | OUTPATIENT
Start: 2022-02-09 | End: 2022-02-11

## 2022-02-09 ASSESSMENT — PATIENT HEALTH QUESTIONNAIRE - PHQ9: SUM OF ALL RESPONSES TO PHQ QUESTIONS 1-9: 9

## 2022-02-09 NOTE — TELEPHONE ENCOUNTER
Date of Last Office Visit: 01/03/2022  Date of Next Office Visit: 02/11/2022  No shows since last visit: 0  Cancellations since last visit: 0    Medication requested: doxepin (SINEQUAN) 10 MG/ML (HIGH CONC) solution Date last ordered: 01/07/2022 Qty: 118mL Refills: 0     Medication requested: busPIRone (BUSPAR) 10 MG  Date last ordered: 01/03/2022 Qty: 60 Refills: 1 - to other pharmacy     Lapse in medication adherence greater than 5 days?: no  If yes, call patient and gather details: n/a  Medication refill request verified as identical to current order?: yes  Result of Last DAM, VPA, Li+ Level, CBC, or Carbamazepine Level (at or since last visit): N/A    Last visit treatment plan:     Increase propranolol extended release to 80 mg daily for generalized anxiety disorder. Let me know if you do not tolerate and we need to decrease back to 60 mg dose.     Continue doxepin 3-10 mg (0.3-1.0 mL) at bedtime as needed for sleep.     Continue lamotrigine 200 mg daily for mood/bipolar disorder.    Start BusPar/buspirone for anxiety: take 5 mg twice daily for 1-2 weeks then increase to 10 mg twice daily as tolerated. Let me know if you have severe nausea and need zofran on a temporary basis to get used to the medication.       []Medication refilled per  Medication Refill in Ambulatory Care  policy.  [x]Medication unable to be refilled by RN due to criteria not met as indicated below:    []Eligibility - not seen in the last year   []Supervision - no future appointment   []Compliance - no shows, cancellations or lapse in therapy   []Verification - order discrepancy   []Controlled medication   [x]Medication not included in policy   []90-day supply request   []Other

## 2022-02-11 ENCOUNTER — VIRTUAL VISIT (OUTPATIENT)
Dept: PSYCHIATRY | Facility: CLINIC | Age: 22
End: 2022-02-11
Payer: COMMERCIAL

## 2022-02-11 ENCOUNTER — VIRTUAL VISIT (OUTPATIENT)
Dept: PSYCHOLOGY | Facility: CLINIC | Age: 22
End: 2022-02-11
Payer: COMMERCIAL

## 2022-02-11 DIAGNOSIS — G47.00 INSOMNIA, UNSPECIFIED TYPE: ICD-10-CM

## 2022-02-11 DIAGNOSIS — Z86.59 HX OF EATING DISORDER: ICD-10-CM

## 2022-02-11 DIAGNOSIS — F41.1 GAD (GENERALIZED ANXIETY DISORDER): Primary | ICD-10-CM

## 2022-02-11 DIAGNOSIS — F31.75 BIPOLAR DISORDER, IN PARTIAL REMISSION, MOST RECENT EPISODE DEPRESSED (H): Primary | ICD-10-CM

## 2022-02-11 DIAGNOSIS — F31.9 BIPOLAR AFFECTIVE DISORDER, REMISSION STATUS UNSPECIFIED (H): ICD-10-CM

## 2022-02-11 DIAGNOSIS — F19.11 SUBSTANCE ABUSE IN REMISSION (H): ICD-10-CM

## 2022-02-11 DIAGNOSIS — Z86.59 HISTORY OF POSTTRAUMATIC STRESS DISORDER (PTSD): ICD-10-CM

## 2022-02-11 DIAGNOSIS — F40.10 SOCIAL ANXIETY DISORDER: ICD-10-CM

## 2022-02-11 DIAGNOSIS — F41.1 GAD (GENERALIZED ANXIETY DISORDER): ICD-10-CM

## 2022-02-11 PROCEDURE — 90832 PSYTX W PT 30 MINUTES: CPT | Mod: 95 | Performed by: PSYCHOLOGIST

## 2022-02-11 PROCEDURE — 99214 OFFICE O/P EST MOD 30 MIN: CPT | Mod: 95 | Performed by: PSYCHIATRY & NEUROLOGY

## 2022-02-11 RX ORDER — LAMOTRIGINE 200 MG/1
200 TABLET ORAL DAILY
Qty: 90 TABLET | Refills: 0 | Status: SHIPPED | OUTPATIENT
Start: 2022-02-11 | End: 2022-06-08

## 2022-02-11 RX ORDER — BUSPIRONE HYDROCHLORIDE 10 MG/1
20 TABLET ORAL 2 TIMES DAILY
Qty: 120 TABLET | Refills: 0 | Status: SHIPPED | OUTPATIENT
Start: 2022-02-11 | End: 2022-03-25

## 2022-02-11 RX ORDER — DOXEPIN HYDROCHLORIDE 10 MG/ML
5-10 SOLUTION ORAL
Qty: 118 ML | Refills: 0 | Status: SHIPPED | OUTPATIENT
Start: 2022-02-11 | End: 2022-06-08

## 2022-02-11 ASSESSMENT — ANXIETY QUESTIONNAIRES
GAD7 TOTAL SCORE: 13
GAD7 TOTAL SCORE: 13
7. FEELING AFRAID AS IF SOMETHING AWFUL MIGHT HAPPEN: SEVERAL DAYS
6. BECOMING EASILY ANNOYED OR IRRITABLE: SEVERAL DAYS
4. TROUBLE RELAXING: MORE THAN HALF THE DAYS
7. FEELING AFRAID AS IF SOMETHING AWFUL MIGHT HAPPEN: SEVERAL DAYS
5. BEING SO RESTLESS THAT IT IS HARD TO SIT STILL: SEVERAL DAYS
2. NOT BEING ABLE TO STOP OR CONTROL WORRYING: MORE THAN HALF THE DAYS
3. WORRYING TOO MUCH ABOUT DIFFERENT THINGS: NEARLY EVERY DAY
GAD7 TOTAL SCORE: 13
1. FEELING NERVOUS, ANXIOUS, OR ON EDGE: NEARLY EVERY DAY

## 2022-02-11 ASSESSMENT — PATIENT HEALTH QUESTIONNAIRE - PHQ9
SUM OF ALL RESPONSES TO PHQ QUESTIONS 1-9: 10
10. IF YOU CHECKED OFF ANY PROBLEMS, HOW DIFFICULT HAVE THESE PROBLEMS MADE IT FOR YOU TO DO YOUR WORK, TAKE CARE OF THINGS AT HOME, OR GET ALONG WITH OTHER PEOPLE: SOMEWHAT DIFFICULT
SUM OF ALL RESPONSES TO PHQ QUESTIONS 1-9: 10

## 2022-02-11 NOTE — PATIENT INSTRUCTIONS
Treatment Plan:    Continue propranolol extended release to 80 mg daily for generalized anxiety disorder.     Continue doxepin 3-10 mg (0.3-1.0 mL) at bedtime as needed for sleep.     Continue lamotrigine 200 mg daily for mood/bipolar disorder.    Increase BusPar/buspirone: take 15 mg twice daily for 1-2 weeks then increase to 20 mg twice daily as tolerated. Let me know before you run out of current Rx if you need refill of 15 mg twice daily or 20 mg twice daily and we can send new Rx to [facility] if you prefer.      Continue all other cares per primary care provider.     Continue all other medications as reviewed per electronic medical record today.     Safety plan reviewed. To the Emergency Department as needed or call after hours crisis line at [phone] or [phone]. [place] Crisis Text Line. Text [place] to 679590 or Suicide LifeLine Chat: suicidepreventionlifeline.org/chat    Recommend individual psychotherapy.    Schedule an appointment with me in about 6 weeks or sooner as needed. Call [facility] at [phone] to schedule.    Follow up with primary care provider as planned or for acute medical concerns.    Call the psychiatric nurse line with medication questions or concerns at [phone].    [employer] may be used to communicate with your provider, but this is not intended to be used for emergencies.    Have previously discussed Lamictal (lamotrigine) can cause serious rashes including Gómez-Nima syndrome which may requiring hospitalization and discontinuation of treatment. If any signs of a rash occur, please see your Primary Care Provider or a dermatologist immediately.

## 2022-02-11 NOTE — PROGRESS NOTES
[facility] Psychiatry Service  February 11, 2022    Behavioral Health Clinician Progress Note    Patient Name: Marija Langley      Telemedicine Visit: The patient's condition can be safely assessed and treated via synchronous audio and visual telemedicine encounter.      Reason for Telemedicine Visit: Services only offered telehealth    Originating Site (Patient Location): Patient's home    Distant Site (Provider Location): Provider Remote Setting- Home Office    Consent:  The patient/guardian has verbally consented to: the potential risks and benefits of telemedicine (video visit) versus in person care; bill my insurance or make self-payment for services provided; and responsibility for payment of non-covered services.     Mode of Communication:  Video Conference via [employer]    As the provider I attest to compliance with applicable laws and regulations related to telemedicine.         Service Type:  Individual      Service Location:   [facility]     Session Start Time: 07:30am  Session End Time: 08:00am      Session Length: 16 - 37      Attendees: Patient    Visit Activities (Refresh list every visit): [facility] Only    Diagnostic Assessment Date: 07/30/2021  Treatment Plan Review Date: 05/11/2022  See Flowsheets for today's PHQ-9 and CATRACHITO-7 results  Previous PHQ-9:   PHQ-9 SCORE 1/3/2022 2/8/2022 2/11/2022   PHQ-9 Total Score MyChart 6 (Mild depression) 9 (Mild depression) 10 (Moderate depression)   PHQ-9 Total Score 6 9 10     Previous CATRACHITO-7:   CATRACHITO-7 SCORE 9/21/2021 1/3/2022 2/11/2022   Total Score 9 (mild anxiety) 10 (moderate anxiety) 13 (moderate anxiety)   Total Score 9 10 13         DATA  Extended Session (60+ minutes): No  Interactive Complexity: No  Crisis: No  [facility] Patient: No    Treatment Objective(s) Addressed in This Session:  Target Behavior(s): anxiety symptoms    Anxiety: will develop more effective coping skills to manage anxiety symptoms    Current Stressors / Issues:  Reported  "that she feels the Buspar is helping but feels like it is a little too early to tell. She feels it is better than her prior medication. She also feels the increased propranolol has been fine. She uses that once a morning. She feels like it is easier to go to work, walk into a room and talk with people, and it overall feels less burdened and feels \"lighter.\"       Progress on Treatment Objective(s) / Homework:  Satisfactory progress - ACTION (Actively working towards change); Intervened by reinforcing change plan / affirming steps taken    Motivational Interviewing    MI Intervention: Expressed Empathy/Understanding, Permission to raise concern or advise, Open-ended questions, Reflections: simple and complex and Reframe     Change Talk Expressed by the Patient: Reasons to change Activation Taking steps    Provider Response to Change Talk: E - Evoked more info from patient about behavior change, A - Affirmed patient's thoughts, decisions, or attempts at behavior change, R - Reflected patient's change talk and S - Summarized patient's change talk statements    Also provided psychoeducation about behavioral health condition, symptoms, and treatment options    Care Plan review completed: Yes    Medication Review:  Changes to psychiatric medications, see updated Medication List in EPIC.     Medication Compliance:  Yes    Changes in Health Issues:   None reported    Chemical Use Review:   Substance Use: Chemical use reviewed, no active concerns identified      Tobacco Use: No current tobacco use.      Assessment: Current Emotional / Mental Status (status of significant symptoms):  Risk status (Self / Other harm or suicidal ideation)  Patient has had a history of suicidal ideation: as a teen and suicide attempts: once as a teen  Patient denies current fears or concerns for personal safety.  Patient denies current or recent suicidal ideation or behaviors.  Patient denies current or recent homicidal ideation or " behaviors.  Patient denies current or recent self injurious behavior or ideation.  Patient denies other safety concerns.  A safety and risk management plan has been developed including: Patient consented to co-developed safety plan.  A safety and risk management plan was completed.  Patient agreed to use safety plan should any safety concerns arise.  A copy was given to the patient.    Appearance:   Appropriate   Eye Contact:   Good   Psychomotor Behavior: Normal   Attitude:   Cooperative   Orientation:   All  Speech   Rate / Production: Normal    Volume:  Normal   Mood:    Anxious   Affect:    Appropriate   Thought Content:  Clear   Thought Form:  Coherent  Logical   Insight:    Good     Diagnoses:  1. CATRACHITO (generalized anxiety disorder)    2. Bipolar affective disorder, remission status unspecified (H)    3. Social anxiety disorder        Collateral Reports Completed:  Communicated with: Dr. Argueta    Plan: (Homework, other):  Patient was given information about behavioral services and instructed to schedule a follow up [facility]  She was also given information about mental health symptoms and treatment options .  CD Recommendations: No indications of CD issues. Lakhwinder Chung PsyD, LP      ______________________________________________________________________    [facility] Psychiatry Service    Patient's Name: Marija Langley  YOB: 2000    Date: February 11, 2022    DSM-V Diagnoses: 296.89 Bipolar II Disorder With anxious distress or 300.23 (F40.10) Social Anxiety Disorder  300.02 (F41.1) Generalized Anxiety Disorder  Psychosocial / Contextual Factors: struggling to attend class/work    WHODAS  WHODAS 2.0 Total Score 7/29/2021   Total Score 33   Total Score MyChart 33          Anticipated number of session or this episode of care: 5    MeasurableTreatment Goal(s) related to diagnosis / functional impairment(s)  Goal 1:  "Patient will work with providers to manage symptoms    I will know I've met my goal when I have less anxiety and it's easier to talk to people.       Objective #A (Patient Action)                          Patient will attend all appointments, take medication as prescribed.  Status: New - Date: 02/11/2022      Intervention(s)  [facility] will Monitor and assist in overcoming barriers to treatment.     Objective #B  Patient will consider all recommendations offered.  Status: New - Date: 02/11/2022      Intervention(s)  [facility] will educate patient on treatment options, clarify concerns, work with pt to overcome any resistance to compliance.        Goal 2: Patient will engage in self-care activities    I will know I've met my goal when I don't have those thoughts.       Objective #A (Patient Action)                          Status: New - Date: 02/11/2022  Patient will assist in creating safety plan.     Intervention(s)  [facility] will assist in creation of safety plan and provide copy to patient.     Objective #B  Patient will report using safety plan as needed.                       Status: New - Date: 02/11/2022     Intervention(s)  [facility] will monitor safety, use of plan, adjust plan as needed, work through any identified barriers/resistance to following her plan.     Patient has reviewed and agreed to the above plan.        Jerry Chung PsyD  February 11, 2022        [facility]                                       Patient's Name: Marija Langley  July 30, 2021     SAFETY PLAN:  Step 1: Warning signs / cues (Thoughts, images, mood, situation, behavior) that a crisis may be developing:  ? Thoughts: \"I don't matter\", \"People would be better off without me\", \"I can't do this anymore\" and \"I just want this to end\"  ? Images: obsessive thoughts of death or dying:   and visions of harm:    ? Thinking Processes: ruminations (can't stop thinking about my problems):  , racing thoughts, intrusive thoughts " "(bothersome, unwanted thoughts that come out of nowhere):   and highly critical and negative thoughts:    ? Mood: worsening depression, intense anger, intense worry, agitation, disinhibited (not caring about things or consequences) and mood swings  ? Behaviors: isolating/withdrawing , can't stop crying, impulsive, reckless behaviors (acting without thinking):  , not taking care of myself, not taking care of my responsibilities and not sleeping enough  ? Situations: relationship problems and financial stress   Step 2: Coping strategies - Things I can do to take my mind off of my problems without contacting another person (relaxation technique, physical activity):  ? Distress Tolerance Strategies:  relaxation activities:  , listen to positive and upbeat music:   and sensory based activities/self-soothe with five senses:    ? Physical Activities: go for a walk, yoga, meditation, deep breathing and stretching   ? Focus on helpful thoughts:  \"This is temporary\", \"I will get through this\" and remind myself of what is important to me:    Step 3: People and social settings that provide distraction:              Name: Pranav     Phone: In my phone              Name: Mom    Phone: in my phone              Name: Friend  Phone: in my phone      Step 4: Remind myself of people and things that are important to me and worth living for:  Fiancee, family, friends  Step 5: When I am in crisis, I can ask these people to help me use my safety plan:              Name: Pranav     Phone: In my phone              Name: Mom    Phone: in my phone              Name: Friend  Phone: in my phone      Step 6: Making the environment safe:   ? dispose of old medications , remove things I could use to hurt myself:   and be around others  Step 7: Professionals or agencies I can contact during a crisis:  ? Suicide Prevention Lifeline: 9-469-210-TALK (2902)  ? Crisis Text Line Service: Text   [place]  to 592522.    Local Crisis Services:   [facility]" [address] [place] [address]  [phone]     Call 911 or go to my nearest emergency department.       I helped develop this safety plan and agree to use it when needed.  I have been given a copy of this plan.       Patient signature: _______________________________________________________________  Today s date:  July 30, 2021  Adapted from Safety Plan Template 2008 [employer]jor[employer] and Rj Georges is reprinted with the express permission of the authors.  No portion of the Safety Plan Template may be reproduced without the express, written permission.  You can contact the authors at [email or web address] or [email or web address]

## 2022-02-11 NOTE — PROGRESS NOTES
"Marija Langley is a 21 year old year old who is being evaluated via a billable video visit.      How would you like to obtain your AVS? MyChart  If you are dropped from the video visit, the video invite should be resent to: Text to cell phone: see Epic  Will anyone else be joining your video visit? No     Telemedicine Visit: The patient's condition can be safely assessed and treated via synchronous audio and visual telemedicine encounter.      Reason for Telemedicine Visit: Covid-19 Pandemic    Originating Site (Patient Location): Patient's home     Distant Site (Provider Location): Provider Remote Setting    Mode of Communication:  Video Conference via [employer]    As the provider I attest to compliance with applicable laws and regulations related to telemedicine.        Outpatient Psychiatric Progress Note    Name: Marija Langley   : 2000                    Primary Care Provider: Cony Bishop PA-C   Therapist: None     PHQ-9 scores:  PHQ-9 SCORE 1/3/2022 2022 2022   PHQ-9 Total Score [facility] 6 (Mild depression) 9 (Mild depression) 10 (Moderate depression)   PHQ-9 Total Score 6 9 10       CATRACHITO-7 scores:  CATRACHITO-7 SCORE 2021 1/3/2022 2022   Total Score 9 (mild anxiety) 10 (moderate anxiety) 13 (moderate anxiety)   Total Score 9 10 13       Patient Identification:  Patient is a 21 year old, partnered / significant other  White Not  or  female  who presents for return visit with me.  Patient is currently a student. Patient attended the phone/video session alone. Patient prefers to be called: \"Marija\".    Interim History:  I last saw Marija Langley for outpatient psychiatry return visit on 1/3/2022. During that appointment, we:     Increase propranolol extended release to 80 mg daily for generalized anxiety disorder. Let me know if you do not tolerate and we need to decrease back to 60 mg dose.     Continue doxepin 3-10 mg (0.3-1.0 mL) at bedtime as needed for sleep. " "    Continue lamotrigine 200 mg daily for mood/bipolar disorder.    Start BusPar/buspirone for anxiety: take 5 mg twice daily for 1-2 weeks then increase to 10 mg twice daily as tolerated. Let me know if you have severe nausea and need zofran on a temporary basis to get used to the medication.     2/11: Overall patient reports doing fairly well.  Increased dose of propranolol tolerated well with no negative side effects and has been helpful.  BuSpar is also been helpful.  No negative side effects from buspirone.  Wondering if possible dose increase of buspirone could potentially be even more helpful.  No acute safety concerns or SI today.  No problematic drug or alcohol use.    Per [facility], Dr. Lakhwinder Chung, during today's team-based visit:  Reported that she feels the Buspar is helping but feels like it is a little too early to tell. She feels it is better than her prior medication. She also feels the increased propranolol has been fine. She uses that once a morning. She feels like it is easier to go to work, walk into a room and talk with people, and it overall feels less burdened and feels \"lighter.\"     Past Psychiatric Med Trials:  Prozac/fluoxetine-in a \"rage\" on Prozac. Was throwing things and states it ended up on her allergy list. Wellbutrin-wasn't helpful.   Seroquel - hypotension, sedation  Risperidone - \"Sleeping non-stop\" on a month  Lamotrigine - helpful but missed some doses, maybe up to 100 mg daily, last on about 5 years ago  Gabapentin - anxiety  risperidone-sedation  Naltrexone  Hydroxyzine-not as effective as doxepin for sleep so discontinued    Psychiatric ROS:  Marija Langley reports mood has been: Improved/stable  Anxiety has been: A little worse recently, see HPI above  Sleep has been: Much improved on doxepin  Leanna sxs: None  Psychosis sxs: None  ADHD/ADD sxs: N/A  PTSD sxs: None/stable  PHQ9 and GAD7 scores were reviewed today if completed.   Medication side effects: Denies  Current stressors " include: Symptoms and See HPI above  Coping mechanisms and supports include: Family, Hobbies and Friends    Current medications include:   Current Outpatient Medications   Medication Sig     Acetaminophen (TYLENOL PO) Take 325 mg by mouth as needed for mild pain or fever     busPIRone (BUSPAR) 10 MG tablet Take 1 tablet (10 mg) by mouth 2 times daily     doxepin (SINEQUAN) 10 MG/ML (HIGH CONC) solution Take 0.5-1 mLs (5-10 mg) by mouth nightly as needed for sleep     lamoTRIgine (LAMICTAL) 200 MG tablet Take 1 tablet (200 mg) by mouth daily     propranolol ER (INDERAL LA) 80 MG 24 hr capsule Take 1 capsule (80 mg) by mouth daily     No current facility-administered medications for this visit.       Past Medical/Surgical History:  Past Medical History:   Diagnosis Date     Anorexia nervosa      Anxiety      Bulimia nervosa      Depressive disorder       has a past medical history of Anorexia nervosa, Anxiety, Bulimia nervosa, and Depressive disorder.    Social History:  Reviewed. No changes to social history except as noted above in HPI.    Vital Signs:   None. This is phone/video visit.     Labs:  Most recent laboratory results reviewed and no new labs.     Review of Systems:  10 systems (general, cardiovascular, respiratory, eyes, ENT, endocrine, GI, , M/S, neurological) were reviewed. Most pertinent finding(s) is/are: denies fever, cough, headaches, shortness of breath, chest pain, N/V, constipation/diarrhea, trouble urinating, aches and pains. The remaining systems are all unremarkable.    Mental Status Examination (limited as this is by phone/video):  Appearance: Awake, alert, appears stated age, no acute distress, well-groomed   Attitude:  cooperative, pleasant   Motor: No gross abnormalities observed via video, not formally tested   Oriented to:  person, place, time, and situation  Attention Span and Concentration:  normal  Speech:  clear, coherent, regular rate, rhythm, and volume  Language: intact  Mood:  Pretty good  Affect:  appropriate and in normal range and mood congruent  Associations:  no loose associations  Thought Process:  logical, linear and goal oriented  Thought Content:  no evidence of suicidal ideation or homicidal ideation, no evidence of psychotic thought, no auditory hallucinations present and no visual hallucinations present  Recent and Remote Memory:  Intact to interview. Not formally assessed. No amnesia.  Fund of Knowledge: appropriate  Insight:  good  Judgment:  intact, adequate for safety  Impulse Control:  intact    Suicide Risk Assessment:  Today Marija Langley reports no suicidal ideation. Based on all available evidence including the factors cited above, Marija Langley does not appear to be at imminent risk for self-harm, does not meet criteria for a 72-hr hold, and therefore remains appropriate for ongoing outpatient level of care.  A thorough assessment of risk factors related to suicide and self-harm have been reviewed and are noted above. The patient convincingly denies suicidality on several occasions. Local community safety resources printed and reviewed for patient to use if needed. There was no deceit detected, and the patient presented in a manner that was believable.     DSM5 Diagnosis:  Bipolar II Disorder, in partial remission, most recent episode depressed  Generalized anxiety disorder  Insomnia, unspecified  History of PTSD  History of eating disorder  Polysubstance abuse history-in sustained remission    Medical comorbidities include:   Patient Active Problem List    Diagnosis Date Noted     Bipolar affective disorder, remission status unspecified (H) 05/26/2021     Priority: Medium     Reports she didn't do well on meds in the past.       CATRACHITO (generalized anxiety disorder) 05/26/2021     Priority: Medium     Bulimia 05/26/2021     Priority: Medium     Personal history of urinary tract infection 05/26/2021     Priority: Medium     History of ureter surgery which helped but  still gets UTI 1-2 x per year.         Psychosocial & Contextual Factors: see HPI above    Assessment:  From Intake, 7/30/2021:  Marija Langley is a 21-year-old female with past psychiatric history including eating disorder, PTSD, bipolar disorder, anxiety, polysubstance abuse-in sustained remission who presents today for psychiatric evaluation.  Patient's mental health history dates back several years into childhood.  Struggled quite significantly with an eating disorder and substance abuse around 2015.  Patient completed inpatient and outpatient treatment for her eating disorder a couple different programs.  Treatment overall quite successful since she does not struggle much at all with disordered eating.  Will skip a meal every now and again but does not find herself significantly restricting and no longer purges.  No significant active PTSD symptoms.  Patient also not abusing drugs or alcohol for quite some time.  Presents today mainly with concerns for bipolar disorder and a desire to get back on psychiatric medications for her condition.  History of failing multiple antidepressants and doing well on mood stabilizers.  Feels like she did the best on lamotrigine.  Risperidone and Seroquel somewhat helpful for her symptoms but incredibly sedating.  At this time she is agreeable to restarting lamotrigine titration.  Discussed risks and benefits of treatment.  She is also hopeful to engage in individual psychotherapy again.  Has history of using gabapentin as needed for anxiety.  This was also prescribed today.  Will titrate dose as clinically indicated.  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  Propranolol trial started to take as needed for social anxiety symptoms.  Her blood pressure is on the soft side and so we will start incredibly low at 5-10 mg twice daily as needed.  She was instructed to discontinue if she feels too lightheaded or dizzy.  Hydroxyzine given  to take at bedtime for sleep since she has been having some recent sleep struggles.  Hopefully her sleep will improve as her mood is better stabilized on lamotrigine.  No safety concerns today.  No SI.  No drug or alcohol abuse.    9/21/2021:   Patient overall with improvement of mood since starting lamotrigine titration.  Anxiety still high at times but slightly decreased as well.  Still struggling asleep and hydroxyzine not very effective and caused too much excess sedation next day.  Did not notice anything with propranolol and so we will increase the dose to 20 mg up to twice daily as needed for anxiety symptoms.  Tolerating gabapentin well and finding it helpful.  We will continue to optimize gabapentin therapy.  Continue titrating lamotrigine.  We will start trial doxepin for sleep.  Also discussed possibly using doxylamine succinate if doxepin is not helpful.  No safety concerns or SI.  No problematic drug or alcohol use.    11/4/2021:   Patient overall doing quite well.  Mood and anxiety stable.  Sleep also improved.  We will transition her from immediate release propranolol to once daily extended release propranolol since she is taking it twice every day.  This will help simplify her medication regimen.  It will be a slight dose increase so she will watch for any negative side effects as discussed today.  We will also discontinue hydroxyzine and gabapentin to further simplify her medication list.  No acute safety concerns.  No SI.  No problematic drug or alcohol use.    1/3/2021:  Patient overall doing quite well for the most part except for some ongoing pretty intense daily anxiety.  Extended release propranolol has been helpful but she continues to have some significant physical symptoms of anxiety.  We will increase extended release propranolol to 80 mg daily.  She will watch for feeling too lightheaded or dizzy.  We will also start BuSpar/buspirone to help with anxiety.  Mood has been stable.   Encouraged individual psychotherapy.  No acute safety concerns.  No SI.  No problematic drug or alcohol use.    2/11/2022:   Patient overall with some improvement of symptoms on buspirone and tolerating well with no negative side effects.  She is agreeable to further optimizing buspirone treatment.  We will increase to 20 mg twice daily if tolerated.  No other medication changes today.  Likely getting close to a good regimen and possible return to primary care provider soon.  No acute safety concerns or SI.  No problematic drug or alcohol use.    Medication side effects and alternatives were reviewed. Health promotion activities recommended and reviewed today. All questions addressed. Education and counseling completed regarding risks and benefits of medications and psychotherapy options. Recommend therapy for additional support.     Treatment Plan:    Continue propranolol extended release to 80 mg daily for generalized anxiety disorder.     Continue doxepin 3-10 mg (0.3-1.0 mL) at bedtime as needed for sleep.     Continue lamotrigine 200 mg daily for mood/bipolar disorder.    Increase BusPar/buspirone: take 15 mg twice daily for 1-2 weeks then increase to 20 mg twice daily as tolerated. Let me know before you run out of current Rx if you need refill of 15 mg twice daily or 20 mg twice daily and we can send new Rx to [facility] mail if you prefer.      Continue all other cares per primary care provider.     Continue all other medications as reviewed per electronic medical record today.     Safety plan reviewed. To the Emergency Department as needed or call after hours crisis line at [phone] or [phone]. [place] Crisis Text Line. Text [place] to 885169 or Suicide LifeLine Chat: suicidepreventionlifeline.org/chat    Recommend individual psychotherapy.    Schedule an appointment with me in about 6 weeks or sooner as needed. Call [facility] at [phone] to schedule.    Follow up with primary care  provider as planned or for acute medical concerns.    Call the psychiatric nurse line with medication questions or concerns at [phone].    MyChart may be used to communicate with your provider, but this is not intended to be used for emergencies.    Have previously discussed Lamictal (lamotrigine) can cause serious rashes including Gómez-Nima syndrome which may requiring hospitalization and discontinuation of treatment. If any signs of a rash occur, please see your Primary Care Provider or a dermatologist immediately.     Administrative Billing:   Phone Call/Video Duration: 9 Minutes  Start: 8:06a  Stop: 8:15a    Time spent with patient was 9 minutes and greater than 50% of time or 5 minutes was spent in counseling and coordination of care regarding above diagnoses and treatment plan.     Patient Status:  Patient will continue to be seen for ongoing consultation and stabilization.    Signed:   Soheila Argueta DO  [facility]    Disclaimer: This note consists of symbols derived from keyboarding, dictation and/or voice recognition software. As a result, there may be errors in the script that have gone undetected. Please consider this when interpreting information found in this chart.

## 2022-02-12 ASSESSMENT — ANXIETY QUESTIONNAIRES: GAD7 TOTAL SCORE: 13

## 2022-02-22 ENCOUNTER — VIRTUAL VISIT (OUTPATIENT)
Dept: PSYCHOLOGY | Facility: CLINIC | Age: 22
End: 2022-02-22
Payer: COMMERCIAL

## 2022-02-22 DIAGNOSIS — F41.1 GAD (GENERALIZED ANXIETY DISORDER): ICD-10-CM

## 2022-02-22 DIAGNOSIS — F31.9 BIPOLAR AFFECTIVE DISORDER, REMISSION STATUS UNSPECIFIED (H): Primary | ICD-10-CM

## 2022-02-22 DIAGNOSIS — G47.00 INSOMNIA, UNSPECIFIED TYPE: ICD-10-CM

## 2022-02-22 PROCEDURE — 90834 PSYTX W PT 45 MINUTES: CPT | Mod: 95 | Performed by: PSYCHOLOGIST

## 2022-02-22 NOTE — PROGRESS NOTES
[facility]aborative Care Psychiatry Service ([facility])  February 8, 2022    Behavioral Health Clinician Progress Note    Patient Name: Marija Langley      Telemedicine Visit: The patient's condition can be safely assessed and treated via synchronous audio and visual telemedicine encounter.      Reason for Telemedicine Visit: Services only offered telehealth    Originating Site (Patient Location): Patient's home    Distant Site (Provider Location): Provider Remote Setting- Home Office    Consent:  The patient/guardian has verbally consented to: the potential risks and benefits of telemedicine (video visit) versus in person care; bill my insurance or make self-payment for services provided; and responsibility for payment of non-covered services.     Mode of Communication:  Video Conference via [employer]    As the provider I attest to compliance with applicable laws and regulations related to telemedicine.         Service Type:  Individual      Session Start Time: 10:05am  Session End Time: 10:50am      Session Length: 38 - 52      Attendees: Client    Visit Activities (Refresh list every visit): [facility] Only      Diagnostic Assessment Date: 07/30/2021  Treatment Plan Review Date: 04/25/2022  See Flowsheets for today's PHQ-9 and CATRACHITO-7 results  Previous PHQ-9:   PHQ-9 SCORE 1/3/2022 2/8/2022 2/11/2022   PHQ-9 Total Score MyChart 6 (Mild depression) 9 (Mild depression) 10 (Moderate depression)   PHQ-9 Total Score 6 9 10       Previous CATRACHITO-7:   CATRACHITO-7 SCORE 9/21/2021 1/3/2022 2/11/2022   Total Score 9 (mild anxiety) 10 (moderate anxiety) 13 (moderate anxiety)   Total Score 9 10 13       WHODAS  WHODAS 2.0 Total Score 7/29/2021   Total Score 33   Total Score MyChart 33        CAGE  CAGE-AID Flowsheet 7/29/2021   Have you ever felt you should Cut down on your drinking or drug use? 0   Have people Annoyed you by criticizing your drinking or drug use? 0   Have you ever felt bad or Guilty about your drinking or drug use? 0   Have you ever  had a drink or used drugs first thing in the morning to steady your nerves or to get rid of a hangover? (Eye opener) 0   CAGE-AID SCORE 0   1. Have you felt you ought to cut down on your drinking or drug use? No   2. Have people annoyed you by criticizing your drinking or drug use? No   3. Have you felt bad or guilty about your drinking or drug use? No   4. Have you ever had a drink or used drugs first thing in the morning to steady your nerves or to get rid of a hangover (eye opener)? No   CAGE-AID SCORE 0 (A total score of 2 or greater is considered clinically significant)     DATA  Extended Session (60+ minutes): No  Interactive Complexity: No  Crisis: No  [facility] Patient: No    Treatment Objective(s) Addressed in This Session:  Target Behavior(s): Anxiety management    Anxiety: will develop more effective coping skills to manage anxiety symptoms    Medication Compliance:  Yes      Chemical Use Review:   Substance Use: Chemical use reviewed, no active concerns identified      Tobacco Use: No current tobacco use.      Current Stressors/Issues:  Reported that her overall anxiety is much better. She has been doing a better job of recognizing her thoughts as thoughts and not as something she has to believe or be controlled by. She noted that some of the other skills were less effective and it became evident of some misunderstanding the expectation of the skills. Her progress was highlighted, purpose of the skills was clarified and reinforced the way she is using her skills.      Progress on Treatment Objective(s) / Homework:  Satisfactory progress - ACTION (Actively working towards change); Intervened by reinforcing change plan / affirming steps taken    Motivational Interviewing    MI Intervention: Expressed Empathy/Understanding, Supported Autonomy, Collaboration, Evocation, Open-ended questions, Reflections: simple and complex, Change talk (evoked) and Reframe     Change Talk Expressed by the Patient: Activation  Taking steps    Provider Response to Change Talk: E - Evoked more info from patient about behavior change, A - Affirmed patient's thoughts, decisions, or attempts at behavior change, R - Reflected patient's change talk and S - Summarized patient's change talk statements      Also provided psychoeducation about behavioral health condition, symptoms, and treatment options    Care Plan review completed: Yes    Medication Review:  Changes to psychiatric medications, see updated Medication List in EPIC.     Medication Compliance:  Yes    Changes in Health Issues:   None reported    Chemical Use Review:   Substance Use: Chemical use reviewed, no active concerns identified      Tobacco Use: No current tobacco use.        Assessment: Current Emotional / Mental Status (status of significant symptoms):  Risk status (Self / Other harm or suicidal ideation)  Patient has had a history of suicidal ideation: in the pasty and suicide attempts: as a teenager  Patient denies current fears or concerns for personal safety.  Patient denies current or recent suicidal ideation or behaviors.  Patient denies current or recent homicidal ideation or behaviors.  Patient denies current or recent self injurious behavior or ideation.  Patient denies other safety concerns.  A safety and risk management plan has been developed including: Patient consented to co-developed safety plan.  A safety and risk management plan was completed.  Patient agreed to use safety plan should any safety concerns arise.  A copy was given to the patient.    Appearance:   Appropriate   Eye Contact:   Good   Psychomotor Behavior: Normal   Attitude:   Cooperative   Orientation:   All  Speech   Rate / Production: Normal    Volume:  Normal   Mood:    Anxious   Affect:    Appropriate   Thought Content:  Clear   Thought Form:  Coherent  Logical   Insight:    Good        Diagnoses:  1. Bipolar affective disorder, remission status unspecified (H)    2. Insomnia, unspecified  type    3. CATRACHITO (generalized anxiety disorder)        Collateral Reports Completed:  Not Applicable    Plan: (Homework, other):  Patient was given information about behavioral services and encouraged to schedule a follow up in 2 weeks.  She was also given information about mental health symptoms and treatment options .  CD Recommendations: No indications of CD issues.      Lakhwinder Chung PsyD, LP      Jerry Chung PsyD  January 25, 2022      ______________________________________________________________________    Integrated Behavioral Health Treatment Plan    Patient's Name: Marija Langley  YOB: 2000    Date: January 25, 2022    DSM-V Diagnoses: 296.41 Bipolar I Disorder Current or Most Recent Episode Manic, Mild  or 300.23 (F40.10) Social Anxiety Disorder  300.02 (F41.1) Generalized Anxiety Disorder  Psychosocial / Contextual Factors: struggling with attending class, work    Referral / Collaboration:  Referral to another professional/service is not indicated at this time..    Anticipated number of session or this episode of care: 6-8    MeasurableTreatment Goal(s) related to diagnosis / functional impairment(s)  Goal 1: Patient will report increased value directed behaviors    I will know I've met my goal when I can join groups and talk to people more easily.       Objective #A (Patient Action)                          Patient will identify the cost of control/avoidance behaviors as a coping strategy.  Status: New - Date: 01/25/2022     Intervention(s)  [facility] will utilize exercises/worksheets to teach initial concepts (e.g., Choice Point, Life Turnaround, D.O.T.S.)     Objective #B  Patient will practice mindfulness skills.  Status: New - Date: 01/25/2022    Intervention(s)  [facility] will teach various mindfulness techniques (e.g., Notice 5 Things, 10 Mindful breaths, Leaves on a Stream)    Objective #C  Patient will clarify personal values for her life that positively impact behavior choices.  Status: New  - Date: 01/25/2022     Intervention(s)  [facility] will help clarify values via exercises (e.g., values card sort) and worksheets (e.g., Jacquie)     Objective #D  Patient will report increased value determined behaviors with decreased negative impact of symptoms.  Status: New - Date: 01/25/2022     Intervention(s)  [facility] will assign goal setting/committed action homework in service of values      Patient has reviewed and agreed to the above plan.      Jerry Chung PsyD  January 25, 2022

## 2022-03-15 ENCOUNTER — MYC REFILL (OUTPATIENT)
Dept: PSYCHOLOGY | Facility: CLINIC | Age: 22
End: 2022-03-15
Payer: COMMERCIAL

## 2022-03-15 NOTE — TELEPHONE ENCOUNTER
"Refill request r'cd from [facility] via fax for Propanolol  mg denied due to request too soon. Faxed \"not authorized\" back to pharmacy.    Betsy Lock RN March 15, 2022 11:28 AM    "

## 2022-03-22 ENCOUNTER — VIRTUAL VISIT (OUTPATIENT)
Dept: PSYCHOLOGY | Facility: CLINIC | Age: 22
End: 2022-03-22
Payer: COMMERCIAL

## 2022-03-22 DIAGNOSIS — G47.00 INSOMNIA, UNSPECIFIED TYPE: ICD-10-CM

## 2022-03-22 DIAGNOSIS — F31.9 BIPOLAR AFFECTIVE DISORDER, REMISSION STATUS UNSPECIFIED (H): Primary | ICD-10-CM

## 2022-03-22 DIAGNOSIS — F40.10 SOCIAL ANXIETY DISORDER: ICD-10-CM

## 2022-03-22 DIAGNOSIS — F41.1 GAD (GENERALIZED ANXIETY DISORDER): ICD-10-CM

## 2022-03-22 PROCEDURE — 90837 PSYTX W PT 60 MINUTES: CPT | Mod: 95 | Performed by: PSYCHOLOGIST

## 2022-03-22 ASSESSMENT — PATIENT HEALTH QUESTIONNAIRE - PHQ9
SUM OF ALL RESPONSES TO PHQ QUESTIONS 1-9: 12
SUM OF ALL RESPONSES TO PHQ QUESTIONS 1-9: 12
10. IF YOU CHECKED OFF ANY PROBLEMS, HOW DIFFICULT HAVE THESE PROBLEMS MADE IT FOR YOU TO DO YOUR WORK, TAKE CARE OF THINGS AT HOME, OR GET ALONG WITH OTHER PEOPLE: VERY DIFFICULT

## 2022-03-22 NOTE — PROGRESS NOTES
[facility] - Collaborative Care Psychiatry Service (CCPS)  March 22, 2022      Behavioral Health Clinician Progress Note    Patient Name: Marija Langley           Service Type:  Individual      Service Location:   [facility]     Session Start Time: 10:05am  Session End Time: 11:00am      Session Length: 53 - 60      Attendees: Patient     Service Modality:  Video Visit:      Provider verified identity through the following two step process.  Patient provided:  Patient is known previously to provider    Telemedicine Visit: The patient's condition can be safely assessed and treated via synchronous audio and visual telemedicine encounter.      Reason for Telemedicine Visit: Services only offered telehealth    Originating Site (Patient Location): Patient's home    Distant Site (Provider Location): Provider Remote Setting- Home Office    Consent:  The patient/guardian has verbally consented to: the potential risks and benefits of telemedicine (video visit) versus in person care; bill my insurance or make self-payment for services provided; and responsibility for payment of non-covered services.     Patient would like the video invitation sent by:  My Chart    Mode of Communication:  Video Conference via [facility]    As the provider I attest to compliance with applicable laws and regulations related to telemedicine.    Visit Activities (Refresh list every visit): [facility] Only    Diagnostic Assessment Date: 07/30/2021  Treatment Plan Review Date: 04/25/2022  See Flowsheets for today's PHQ-9 and CATRACHITO-7 results  Previous PHQ-9:   PHQ-9 SCORE 2/8/2022 2/11/2022 3/22/2022   PHQ-9 Total Score MyChart 9 (Mild depression) 10 (Moderate depression) 12 (Moderate depression)   PHQ-9 Total Score 9 10 12       Previous CATRACHITO-7:   CATRACHITO-7 SCORE 9/21/2021 1/3/2022 2/11/2022   Total Score 9 (mild anxiety) 10 (moderate anxiety) 13 (moderate anxiety)   Total Score 9 10 13       WHODAS  WHODAS 2.0 Total Score 7/29/2021   Total Score 33   Total Score  Anabelle 33        CAGE  CAGE-AID Flowsheet 7/29/2021   Have you ever felt you should Cut down on your drinking or drug use? 0   Have people Annoyed you by criticizing your drinking or drug use? 0   Have you ever felt bad or Guilty about your drinking or drug use? 0   Have you ever had a drink or used drugs first thing in the morning to steady your nerves or to get rid of a hangover? (Eye opener) 0   CAGE-AID SCORE 0   1. Have you felt you ought to cut down on your drinking or drug use? No   2. Have people annoyed you by criticizing your drinking or drug use? No   3. Have you felt bad or guilty about your drinking or drug use? No   4. Have you ever had a drink or used drugs first thing in the morning to steady your nerves or to get rid of a hangover (eye opener)? No   CAGE-AID SCORE 0 (A total score of 2 or greater is considered clinically significant)     DATA  Extended Session (60+ minutes): No  Interactive Complexity: No  Crisis: No  [facility] Patient: No    Treatment Objective(s) Addressed in This Session:  Target Behavior(s): Anxiety management    Anxiety: will develop more effective coping skills to manage anxiety symptoms    Medication Compliance:  Yes      Chemical Use Review:   Substance Use: Chemical use reviewed, no active concerns identified      Tobacco Use: No current tobacco use.      Current Stressors/Issues:  Spoke about the various thoughts she noticed this last few weeks and her attempts to use new skills to manage the impact of those thoughts. The purpose and expectations of the thoughts were clarified with her. She was able to identify how she used to skills to try and control or get rid of anxiety rather than accepting it and focusing her efforts on whatever the moment needs from her. She indicated she understood the conceptual shift and will work on this more this week.      Progress on Treatment Objective(s) / Homework:  Satisfactory progress - ACTION (Actively working towards change); Intervened by  reinforcing change plan / affirming steps taken    Motivational Interviewing    MI Intervention: Expressed Empathy/Understanding, Supported Autonomy, Collaboration, Evocation, Open-ended questions, Reflections: simple and complex, Change talk (evoked) and Reframe     Change Talk Expressed by the Patient: Activation Taking steps    Provider Response to Change Talk: E - Evoked more info from patient about behavior change, A - Affirmed patient's thoughts, decisions, or attempts at behavior change, R - Reflected patient's change talk and S - Summarized patient's change talk statements      Also provided psychoeducation about behavioral health condition, symptoms, and treatment options    Care Plan review completed: Yes    Medication Review:  Changes to psychiatric medications, see updated Medication List in EPIC.     Medication Compliance:  Yes    Changes in Health Issues:   None reported    Chemical Use Review:   Substance Use: Chemical use reviewed, no active concerns identified      Tobacco Use: No current tobacco use.        Assessment: Current Emotional / Mental Status (status of significant symptoms):  Risk status (Self / Other harm or suicidal ideation)  Patient has had a history of suicidal ideation: in the pasty and suicide attempts: as a teenager  Patient denies current fears or concerns for personal safety.  Patient denies current or recent suicidal ideation or behaviors.  Patient denies current or recent homicidal ideation or behaviors.  Patient denies current or recent self injurious behavior or ideation.  Patient denies other safety concerns.  A safety and risk management plan has been developed including: Patient consented to co-developed safety plan.  A safety and risk management plan was completed.  Patient agreed to use safety plan should any safety concerns arise.  A copy was given to the patient.    Appearance:   Appropriate   Eye Contact:   Good   Psychomotor Behavior: Normal    Attitude:   Cooperative   Orientation:   All  Speech   Rate / Production: Normal    Volume:  Normal   Mood:    Anxious   Affect:    Appropriate   Thought Content:  Clear   Thought Form:  Coherent  Logical   Insight:    Good        Diagnoses:  1. Bipolar affective disorder, remission status unspecified (H)    2. Insomnia, unspecified type    3. CATRACHITO (generalized anxiety disorder)    4. Social anxiety disorder        Collateral Reports Completed:  Not Applicable    Plan: (Homework, other):  Patient was given information about behavioral services and encouraged to schedule a follow up in 2 weeks.  She was also given information about mental health symptoms and treatment options .  CD Recommendations: No indications of CD issues.      Lakhwinder Chung PsyD, LP      Jerry Chung PsyD  March 22, 2022      ______________________________________________________________________    [facility] Psychiatry Service ([facility]PS) Treatment Plan    Patient's Name: Marija Langley  YOB: 2000    Date: January 25, 2022    DSM-V Diagnoses: 296.41 Bipolar I Disorder Current or Most Recent Episode Manic, Mild  or 300.23 (F40.10) Social Anxiety Disorder  300.02 (F41.1) Generalized Anxiety Disorder  Psychosocial / Contextual Factors: struggling with attending class, work    Referral / Collaboration:  Referral to another professional/service is not indicated at this time..    Anticipated number of session or this episode of care: 6-8  Anticipation frequency of session: Every other week  Anticipated Duration of each session: 53 or more minutes  Treatment plan will be reviewed in 90 days or when goals have been changed.       MeasurableTreatment Goal(s) related to diagnosis / functional impairment(s)  Goal 1: Patient will report increased value directed behaviors    I will know I've met my goal when I can join groups and talk to people more easily.       Objective #A (Patient Action)                           Patient will identify the cost of control/avoidance behaviors as a coping strategy.  Status: New - Date: 01/25/2022     Intervention(s)  [facility] will utilize exercises/worksheets to teach initial concepts (e.g., Choice Point, Life Turnaround, D.O.T.S.)     Objective #B  Patient will practice mindfulness skills.  Status: New - Date: 01/25/2022    Intervention(s)  [facility] will teach various mindfulness techniques (e.g., Notice 5 Things, 10 Mindful breaths, Leaves on a Stream)    Objective #C  Patient will clarify personal values for her life that positively impact behavior choices.  Status: New - Date: 01/25/2022     Intervention(s)  [facility] will help clarify values via exercises (e.g., values card sort) and worksheets (e.g., Jacquie)     Objective #D  Patient will report increased value determined behaviors with decreased negative impact of symptoms.  Status: New - Date: 01/25/2022     Intervention(s)  [facility] will assign goal setting/committed action homework in service of values      Patient has reviewed and agreed to the above plan.      Jerry Chung PsyD  January 25, 2022

## 2022-03-23 ASSESSMENT — PATIENT HEALTH QUESTIONNAIRE - PHQ9: SUM OF ALL RESPONSES TO PHQ QUESTIONS 1-9: 12

## 2022-03-25 ENCOUNTER — VIRTUAL VISIT (OUTPATIENT)
Dept: PSYCHIATRY | Facility: CLINIC | Age: 22
End: 2022-03-25
Payer: COMMERCIAL

## 2022-03-25 ENCOUNTER — VIRTUAL VISIT (OUTPATIENT)
Dept: PSYCHOLOGY | Facility: CLINIC | Age: 22
End: 2022-03-25
Payer: COMMERCIAL

## 2022-03-25 DIAGNOSIS — F41.1 GAD (GENERALIZED ANXIETY DISORDER): ICD-10-CM

## 2022-03-25 DIAGNOSIS — F31.9 BIPOLAR AFFECTIVE DISORDER, REMISSION STATUS UNSPECIFIED (H): Primary | ICD-10-CM

## 2022-03-25 DIAGNOSIS — F19.11 SUBSTANCE ABUSE IN REMISSION (H): ICD-10-CM

## 2022-03-25 DIAGNOSIS — G47.00 INSOMNIA, UNSPECIFIED TYPE: ICD-10-CM

## 2022-03-25 DIAGNOSIS — Z86.59 HX OF EATING DISORDER: ICD-10-CM

## 2022-03-25 DIAGNOSIS — Z86.59 HISTORY OF POSTTRAUMATIC STRESS DISORDER (PTSD): ICD-10-CM

## 2022-03-25 DIAGNOSIS — F40.10 SOCIAL ANXIETY DISORDER: ICD-10-CM

## 2022-03-25 DIAGNOSIS — F31.76 BIPOLAR DISORDER, IN FULL REMISSION, MOST RECENT EPISODE DEPRESSED (H): Primary | ICD-10-CM

## 2022-03-25 PROCEDURE — 99214 OFFICE O/P EST MOD 30 MIN: CPT | Mod: 95 | Performed by: PSYCHIATRY & NEUROLOGY

## 2022-03-25 PROCEDURE — 90833 PSYTX W PT W E/M 30 MIN: CPT | Mod: 95 | Performed by: PSYCHOLOGIST

## 2022-03-25 RX ORDER — PROPRANOLOL HYDROCHLORIDE 80 MG/1
80 CAPSULE, EXTENDED RELEASE ORAL DAILY
Qty: 90 CAPSULE | Refills: 0 | Status: SHIPPED | OUTPATIENT
Start: 2022-03-25 | End: 2022-06-08

## 2022-03-25 RX ORDER — BUSPIRONE HYDROCHLORIDE 10 MG/1
20 TABLET ORAL 2 TIMES DAILY
Qty: 360 TABLET | Refills: 0 | Status: SHIPPED | OUTPATIENT
Start: 2022-03-25 | End: 2022-06-08

## 2022-03-25 ASSESSMENT — ANXIETY QUESTIONNAIRES
GAD7 TOTAL SCORE: 10
6. BECOMING EASILY ANNOYED OR IRRITABLE: MORE THAN HALF THE DAYS
2. NOT BEING ABLE TO STOP OR CONTROL WORRYING: SEVERAL DAYS
3. WORRYING TOO MUCH ABOUT DIFFERENT THINGS: MORE THAN HALF THE DAYS
1. FEELING NERVOUS, ANXIOUS, OR ON EDGE: SEVERAL DAYS
4. TROUBLE RELAXING: MORE THAN HALF THE DAYS
GAD7 TOTAL SCORE: 10
GAD7 TOTAL SCORE: 10
7. FEELING AFRAID AS IF SOMETHING AWFUL MIGHT HAPPEN: SEVERAL DAYS
5. BEING SO RESTLESS THAT IT IS HARD TO SIT STILL: SEVERAL DAYS
7. FEELING AFRAID AS IF SOMETHING AWFUL MIGHT HAPPEN: SEVERAL DAYS

## 2022-03-25 ASSESSMENT — PATIENT HEALTH QUESTIONNAIRE - PHQ9
SUM OF ALL RESPONSES TO PHQ QUESTIONS 1-9: 12
10. IF YOU CHECKED OFF ANY PROBLEMS, HOW DIFFICULT HAVE THESE PROBLEMS MADE IT FOR YOU TO DO YOUR WORK, TAKE CARE OF THINGS AT HOME, OR GET ALONG WITH OTHER PEOPLE: SOMEWHAT DIFFICULT
SUM OF ALL RESPONSES TO PHQ QUESTIONS 1-9: 12

## 2022-03-25 NOTE — Clinical Note
Please note this patient's psychiatric care is being discharged from me and returned to their primary care provider. Thanks!

## 2022-03-25 NOTE — PROGRESS NOTES
"Marija Langley is a 21 year old year old who is being evaluated via a billable video visit.      How would you like to obtain your AVS? MyChart  If you are dropped from the video visit, the video invite should be resent to: Text to cell phone: see Epic  Will anyone else be joining your video visit? No     Telemedicine Visit: The patient's condition can be safely assessed and treated via synchronous audio and visual telemedicine encounter.      Reason for Telemedicine Visit: Covid-19 Pandemic    Originating Site (Patient Location): Patient's home     Distant Site (Provider Location): Provider Remote Setting    Mode of Communication:  Video Conference via [employer]    As the provider I attest to compliance with applicable laws and regulations related to telemedicine.        Outpatient Psychiatric Progress Note    Name: Marija Langley   : 2000                    Primary Care Provider: Cony Bishop PA-C   Therapist: None     PHQ-9 scores:  PHQ-9 SCORE 2022 3/22/2022 3/25/2022   PHQ-9 Total Score MyChart 10 (Moderate depression) 12 (Moderate depression) 12 (Moderate depression)   PHQ-9 Total Score 10 12 12       CATRACHITO-7 scores:  CATRACHITO-7 SCORE 1/3/2022 2022 3/25/2022   Total Score 10 (moderate anxiety) 13 (moderate anxiety) 10 (moderate anxiety)   Total Score 10 13 10       Patient Identification:  Patient is a 21 year old, partnered / significant other  White Not  or  female  who presents for return visit with me.  Patient is currently a student. Patient attended the phone/video session alone. Patient prefers to be called: \"Marija\".    Interim History:  I last saw Marija Langley for outpatient psychiatry return visit on 2022. During that appointment, we:     Continue propranolol extended release to 80 mg daily for generalized anxiety disorder.     Continue doxepin 3-10 mg (0.3-1.0 mL) at bedtime as needed for sleep.     Continue lamotrigine 200 mg daily for mood/bipolar " "disorder.    Increase BusPar/buspirone: take 15 mg twice daily for 1-2 weeks then increase to 20 mg twice daily as tolerated. Let me know before you run out of current Rx if you need refill of 15 mg twice daily or 20 mg twice daily and we can send new Rx to [facility] mail if you prefer.      3/25: Patient overall has been doing well.  Feeling quite stable on current medication regimen.  A little tired on increased dose of BuSpar but feels like improvements in her anxiety are worth it.  Takes her second dose at bedtime which does help some with sleep.  Mood stable.  Anxiety stable and manageable.  No acute safety concerns.  No SI.  No problematic drug or alcohol use.    Per [facility], Dr. Lakhwinder Chung, during today's team-based visit:  She noticed that the medications have been helpful. She feels the anxiety overall feels lighter and she is pleased with this. Buspar has been helping but makes her tired. She uses it later in the evening to help her sleep.    Past Psychiatric Med Trials:  Prozac/fluoxetine-in a \"rage\" on Prozac. Was throwing things and states it ended up on her allergy list. Wellbutrin-wasn't helpful.   Seroquel - hypotension, sedation  Risperidone - \"Sleeping non-stop\" on a month  Lamotrigine - helpful but missed some doses, maybe up to 100 mg daily, last on about 5 years ago  Gabapentin - anxiety  risperidone-sedation  Naltrexone  Hydroxyzine-not as effective as doxepin for sleep so discontinued    Psychiatric ROS:  Marija Langley reports mood has been: Improved/stable  Anxiety has been: Improved/stable  Sleep has been: Much improved on doxepin  Leanna sxs: None  Psychosis sxs: None  ADHD/ADD sxs: N/A  PTSD sxs: None/stable  PHQ9 and GAD7 scores were reviewed today if completed.   Medication side effects: Mild sedation from buspirone-tolerable  Current stressors include: Symptoms and See HPI above  Coping mechanisms and supports include: Family, Hobbies and Friends    Current medications include:   Current " Outpatient Medications   Medication Sig     Acetaminophen (TYLENOL PO) Take 325 mg by mouth as needed for mild pain or fever     busPIRone (BUSPAR) 10 MG tablet Take 2 tablets (20 mg) by mouth 2 times daily     doxepin (SINEQUAN) 10 MG/ML (HIGH CONC) solution Take 0.5-1 mLs (5-10 mg) by mouth nightly as needed for sleep     lamoTRIgine (LAMICTAL) 200 MG tablet Take 1 tablet (200 mg) by mouth daily     propranolol ER (INDERAL LA) 80 MG 24 hr capsule Take 1 capsule (80 mg) by mouth daily     No current facility-administered medications for this visit.       Past Medical/Surgical History:  Past Medical History:   Diagnosis Date     Anorexia nervosa      Anxiety      Bulimia nervosa      Depressive disorder       has a past medical history of Anorexia nervosa, Anxiety, Bulimia nervosa, and Depressive disorder.    Social History:  Reviewed. No changes to social history except as noted above in HPI.    Vital Signs:   None. This is phone/video visit.     Labs:  Most recent laboratory results reviewed and no new labs.     Review of Systems:  10 systems (general, cardiovascular, respiratory, eyes, ENT, endocrine, GI, , M/S, neurological) were reviewed. Most pertinent finding(s) is/are: denies fever, cough, headaches, shortness of breath, chest pain, N/V, constipation/diarrhea, trouble urinating, aches and pains. The remaining systems are all unremarkable.    Mental Status Examination (limited as this is by phone/video):  Appearance: Awake, alert, appears stated age, no acute distress, well-groomed   Attitude:  cooperative, pleasant   Motor: No gross abnormalities observed via video, not formally tested   Oriented to:  person, place, time, and situation  Attention Span and Concentration:  normal  Speech:  clear, coherent, regular rate, rhythm, and volume  Language: intact  Mood: Pretty good  Affect:  appropriate and in normal range and mood congruent  Associations:  no loose associations  Thought Process:  logical, linear  and goal oriented  Thought Content:  no evidence of suicidal ideation or homicidal ideation, no evidence of psychotic thought, no auditory hallucinations present and no visual hallucinations present  Recent and Remote Memory:  Intact to interview. Not formally assessed. No amnesia.  Fund of Knowledge: appropriate  Insight:  good  Judgment:  intact, adequate for safety  Impulse Control:  intact    Suicide Risk Assessment:  Today Marija Langley reports no suicidal ideation. Based on all available evidence including the factors cited above, Marija Langley does not appear to be at imminent risk for self-harm, does not meet criteria for a 72-hr hold, and therefore remains appropriate for ongoing outpatient level of care.  A thorough assessment of risk factors related to suicide and self-harm have been reviewed and are noted above. The patient convincingly denies suicidality on several occasions. Local community safety resources printed and reviewed for patient to use if needed. There was no deceit detected, and the patient presented in a manner that was believable.     DSM5 Diagnosis:  Bipolar II Disorder, in full remission, most recent episode depressed  Generalized anxiety disorder  Insomnia, unspecified  History of PTSD  History of eating disorder  Polysubstance abuse history-in sustained remission    Medical comorbidities include:   Patient Active Problem List    Diagnosis Date Noted     Bipolar affective disorder, remission status unspecified (H) 05/26/2021     Priority: Medium     Reports she didn't do well on meds in the past.       CATRACHITO (generalized anxiety disorder) 05/26/2021     Priority: Medium     Bulimia 05/26/2021     Priority: Medium     Personal history of urinary tract infection 05/26/2021     Priority: Medium     History of ureter surgery which helped but still gets UTI 1-2 x per year.         Psychosocial & Contextual Factors: see HPI above    Assessment:  From Intake, 7/30/2021:  Marija Langley is a  21-year-old female with past psychiatric history including eating disorder, PTSD, bipolar disorder, anxiety, polysubstance abuse-in sustained remission who presents today for psychiatric evaluation.  Patient's mental health history dates back several years into childhood.  Struggled quite significantly with an eating disorder and substance abuse around 2015.  Patient completed inpatient and outpatient treatment for her eating disorder a couple different programs.  Treatment overall quite successful since she does not struggle much at all with disordered eating.  Will skip a meal every now and again but does not find herself significantly restricting and no longer purges.  No significant active PTSD symptoms.  Patient also not abusing drugs or alcohol for quite some time.  Presents today mainly with concerns for bipolar disorder and a desire to get back on psychiatric medications for her condition.  History of failing multiple antidepressants and doing well on mood stabilizers.  Feels like she did the best on lamotrigine.  Risperidone and Seroquel somewhat helpful for her symptoms but incredibly sedating.  At this time she is agreeable to restarting lamotrigine titration.  Discussed risks and benefits of treatment.  She is also hopeful to engage in individual psychotherapy again.  Has history of using gabapentin as needed for anxiety.  This was also prescribed today.  Will titrate dose as clinically indicated.  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  Propranolol trial started to take as needed for social anxiety symptoms.  Her blood pressure is on the soft side and so we will start incredibly low at 5-10 mg twice daily as needed.  She was instructed to discontinue if she feels too lightheaded or dizzy.  Hydroxyzine given to take at bedtime for sleep since she has been having some recent sleep struggles.  Hopefully her sleep will improve as her mood is better  stabilized on lamotrigine.  No safety concerns today.  No SI.  No drug or alcohol abuse.    9/21/2021:   Patient overall with improvement of mood since starting lamotrigine titration.  Anxiety still high at times but slightly decreased as well.  Still struggling asleep and hydroxyzine not very effective and caused too much excess sedation next day.  Did not notice anything with propranolol and so we will increase the dose to 20 mg up to twice daily as needed for anxiety symptoms.  Tolerating gabapentin well and finding it helpful.  We will continue to optimize gabapentin therapy.  Continue titrating lamotrigine.  We will start trial doxepin for sleep.  Also discussed possibly using doxylamine succinate if doxepin is not helpful.  No safety concerns or SI.  No problematic drug or alcohol use.    11/4/2021:   Patient overall doing quite well.  Mood and anxiety stable.  Sleep also improved.  We will transition her from immediate release propranolol to once daily extended release propranolol since she is taking it twice every day.  This will help simplify her medication regimen.  It will be a slight dose increase so she will watch for any negative side effects as discussed today.  We will also discontinue hydroxyzine and gabapentin to further simplify her medication list.  No acute safety concerns.  No SI.  No problematic drug or alcohol use.    1/3/2021:  Patient overall doing quite well for the most part except for some ongoing pretty intense daily anxiety.  Extended release propranolol has been helpful but she continues to have some significant physical symptoms of anxiety.  We will increase extended release propranolol to 80 mg daily.  She will watch for feeling too lightheaded or dizzy.  We will also start BuSpar/buspirone to help with anxiety.  Mood has been stable.  Encouraged individual psychotherapy.  No acute safety concerns.  No SI.  No problematic drug or alcohol use.    2/11/2022:   Patient overall with  some improvement of symptoms on buspirone and tolerating well with no negative side effects.  She is agreeable to further optimizing buspirone treatment.  We will increase to 20 mg twice daily if tolerated.  No other medication changes today.  Likely getting close to a good regimen and possible return to primary care provider soon.  No acute safety concerns or SI.  No problematic drug or alcohol use.    3/25/2022:  Patient overall has been doing well.  Finds current medication regimen very helpful.  Feels like mental health symptoms are overall stable.  Mild sedation from BuSpar has been tolerable and the benefits are worth the mild sedation at this time.  Patient and primary care provider could consider further dose increase in the future if anxiety were to worsen.  Max dose would be 30 mg twice daily or 20 mg 3 times daily of buspirone.  Patient could also consider individual psychotherapy for additional support and ongoing development of nonpharmacologic coping skills and strategies.  Due to ongoing stability, her care will be returned back to her primary care provider for ongoing management.  Lamotrigine does not require ongoing laboratory testing.  There is no evidence-based correlation between serum blood levels and bipolar mood improvement, so no need to monitor lamotrigine blood levels.  No acute safety concerns or SI.  No problematic drug or alcohol use.    Medication side effects and alternatives were reviewed. Health promotion activities recommended and reviewed today. All questions addressed. Education and counseling completed regarding risks and benefits of medications and psychotherapy options. Recommend therapy for additional support.     Treatment Plan:    Continue propranolol extended release to 80 mg daily for generalized anxiety disorder.     Continue doxepin 3-10 mg (0.3-1.0 mL) at bedtime as needed for sleep.     Continue lamotrigine 200 mg daily for mood/bipolar disorder. There is no required  ongoing lab monitoring while on lamotrigine.    Continue BusPar/buspirone 20 mg twice daily for axiety.      Due to stability, your care is being returned to your primary care provider. Please reach out to your primary care provider with any questions or concerns.     Continue all other cares per primary care provider.     Continue all other medications as reviewed per electronic medical record today.     Safety plan reviewed. To the Emergency Department as needed or call after hours crisis line at [phone] or [phone]. [place] Crisis Text Line. Text [place] to 349830 or Suicide LifeLine Chat: suicidepreventionlifeline.org/chat    Recommend individual psychotherapy.    Follow up with primary care provider as planned or for acute medical concerns.    [employer] may be used to communicate with your provider, but this is not intended to be used for emergencies.    Thank you for our work together in the Psychiatry Collaborative Care Model at [facility]. This is our last visit and I am returning your care back to your Primary Care Provider Cony Bishop PA-C . If you are not doing well, please contact your Primary Care Provider office.     Have previously discussed Lamictal (lamotrigine) can cause serious rashes including Gómez-Nima syndrome which may requiring hospitalization and discontinuation of treatment. If any signs of a rash occur, please see your Primary Care Provider or a dermatologist immediately.     Administrative Billing:   Phone Call/Video Duration: 8 Minutes  Start: 8:05a  Stop: 8:15a    Time spent with patient was 8 minutes and greater than 50% of time or 5 minutes was spent in counseling and coordination of care regarding above diagnoses and treatment plan.     Patient Status:  The patient is being returned to the referring provider for ongoing care and medication prescribing.  The patient can be re-referred back to this service for further consultation if needed in the  future.    Signed:   Soheila Argueta DO  [facility]    Disclaimer: This note consists of symbols derived from keyboarding, dictation and/or voice recognition software. As a result, there may be errors in the script that have gone undetected. Please consider this when interpreting information found in this chart.

## 2022-03-25 NOTE — PATIENT INSTRUCTIONS
Treatment Plan:    Continue propranolol extended release to 80 mg daily for generalized anxiety disorder.     Continue doxepin 3-10 mg (0.3-1.0 mL) at bedtime as needed for sleep.     Continue lamotrigine 200 mg daily for mood/bipolar disorder. There is no required ongoing lab monitoring while on lamotrigine.    Continue BusPar/buspirone 20 mg twice daily for axiety.      Due to stability, your care is being returned to your primary care provider. Please reach out to your primary care provider with any questions or concerns.     Continue all other cares per primary care provider.     Continue all other medications as reviewed per electronic medical record today.     Safety plan reviewed. To the Emergency Department as needed or call after hours crisis line at [phone] or [phone]. [place] Crisis Text Line. Text [place] to 192028 or Suicide LifeLine Chat: suicidepreventionlifeline.org/chat    Recommend individual psychotherapy.    Follow up with primary care provider as planned or for acute medical concerns.    [employer] may be used to communicate with your provider, but this is not intended to be used for emergencies.    Thank you for our work together in the Psychiatry Collaborative Care Model at [facility]. This is our last visit and I am returning your care back to your Primary Care Provider Cony Bishop PA-C . If you are not doing well, please contact your Primary Care Provider office.     Have previously discussed Lamictal (lamotrigine) can cause serious rashes including Gómez-Nima syndrome which may requiring hospitalization and discontinuation of treatment. If any signs of a rash occur, please see your Primary Care Provider or a dermatologist immediately.

## 2022-03-25 NOTE — Clinical Note
Psychiatry Update/Consult. I am returning Marija Langley's psychiatric care back to you for ongoing management and medication prescribing.  Patient has graduated from [facility] due to ongoing stability and readiness to return to primary care provider. Future medication refills will come from you (I ensured 1.5-3 months today). I recommended that the patient follow up with you in about 2 months for a mental health visit. More details about treatment plan are in my note. If you have any questions or concerns, please don't hesitate to reach out.     Thanks for the referral! It was a pleasure working with Marija Langley.     Soheila Argueta, DO  [facility]aborative [facility] Psychiatry

## 2022-03-25 NOTE — PROGRESS NOTES
[facility] Collaborative Care Psychiatry Service (CCPS)  March 25, 2022      Behavioral Health Clinician Progress Note    Patient Name: Marija Langley           Service Type:  Individual      Service Location:   [facility]     Session Start Time: 07:30am  Session End Time: 08:00am      Session Length: 16 - 37      Attendees: Patient     Service Modality:  Video Visit:      Provider verified identity through the following two step process.  Patient provided:  Patient is known previously to provider    Telemedicine Visit: The patient's condition can be safely assessed and treated via synchronous audio and visual telemedicine encounter.      Reason for Telemedicine Visit: Services only offered telehealth    Originating Site (Patient Location): Patient's home    Distant Site (Provider Location): Provider Remote Setting- Home Office    Consent:  The patient/guardian has verbally consented to: the potential risks and benefits of telemedicine (video visit) versus in person care; bill my insurance or make self-payment for services provided; and responsibility for payment of non-covered services.     Patient would like the video invitation sent by:  My Chart    Mode of Communication:  Video Conference via [facility]    As the provider I attest to compliance with applicable laws and regulations related to telemedicine.    Visit Activities (Refresh list every visit): [facility] Only    Diagnostic Assessment Date: 07/30/2021  Treatment Plan Review Date: 07/25/2022  See Flowsheets for today's PHQ-9 and CATRACHITO-7 results  Previous PHQ-9:   PHQ-9 SCORE 2/11/2022 3/22/2022 3/25/2022   PHQ-9 Total Score MyChart 10 (Moderate depression) 12 (Moderate depression) 12 (Moderate depression)   PHQ-9 Total Score 10 12 12       Previous CATRACHITO-7:   CATRACHITO-7 SCORE 1/3/2022 2/11/2022 3/25/2022   Total Score 10 (moderate anxiety) 13 (moderate anxiety) 10 (moderate anxiety)   Total Score 10 13 10       WHODAS  WHODAS 2.0 Total Score 7/29/2021   Total Score 33    Total Score MyChart 33        CAGE  CAGE-AID Flowsheet 7/29/2021   Have you ever felt you should Cut down on your drinking or drug use? 0   Have people Annoyed you by criticizing your drinking or drug use? 0   Have you ever felt bad or Guilty about your drinking or drug use? 0   Have you ever had a drink or used drugs first thing in the morning to steady your nerves or to get rid of a hangover? (Eye opener) 0   CAGE-AID SCORE 0   1. Have you felt you ought to cut down on your drinking or drug use? No   2. Have people annoyed you by criticizing your drinking or drug use? No   3. Have you felt bad or guilty about your drinking or drug use? No   4. Have you ever had a drink or used drugs first thing in the morning to steady your nerves or to get rid of a hangover (eye opener)? No   CAGE-AID SCORE 0 (A total score of 2 or greater is considered clinically significant)     DATA  Extended Session (60+ minutes): No  Interactive Complexity: No  Crisis: No  [facility] Patient: No    Treatment Objective(s) Addressed in This Session:  Target Behavior(s): Anxiety management    Anxiety: will develop more effective coping skills to manage anxiety symptoms    Medication Compliance:  Yes      Chemical Use Review:   Substance Use: Chemical use reviewed, no active concerns identified      Tobacco Use: No current tobacco use.      Current Stressors/Issues:  She noticed that the medications have been helpful. She feels the anxiety overall feels lighter and she is pleased with this. Buspar has been helping but makes her tired. She uses it later in the evening to help her sleep. We spoke about her increased PHQ9 scores and she identified that her sister's birthday was a week ago and it is bringing up more sadness missing her. Her experience was validated and normalized. She was encouraged to allow herself time to continue grieving.    Progress on Treatment Objective(s) / Homework:  Satisfactory progress - ACTION (Actively working towards  change); Intervened by reinforcing change plan / affirming steps taken    Motivational Interviewing    MI Intervention: Expressed Empathy/Understanding, Supported Autonomy, Collaboration, Evocation, Open-ended questions, Reflections: simple and complex, Change talk (evoked) and Reframe     Change Talk Expressed by the Patient: Activation Taking steps    Provider Response to Change Talk: E - Evoked more info from patient about behavior change, A - Affirmed patient's thoughts, decisions, or attempts at behavior change, R - Reflected patient's change talk and S - Summarized patient's change talk statements      Also provided psychoeducation about behavioral health condition, symptoms, and treatment options    Care Plan review completed: Yes    Medication Review:  Changes to psychiatric medications, see updated Medication List in EPIC.     Medication Compliance:  Yes    Changes in Health Issues:   None reported    Chemical Use Review:   Substance Use: Chemical use reviewed, no active concerns identified      Tobacco Use: No current tobacco use.        Assessment: Current Emotional / Mental Status (status of significant symptoms):  Risk status (Self / Other harm or suicidal ideation)  Patient has had a history of suicidal ideation: in the pasty and suicide attempts: as a teenager  Patient denies current fears or concerns for personal safety.  Patient denies current or recent suicidal ideation or behaviors.  Patient denies current or recent homicidal ideation or behaviors.  Patient denies current or recent self injurious behavior or ideation.  Patient denies other safety concerns.  A safety and risk management plan has been developed including: Patient consented to co-developed safety plan.  A safety and risk management plan was completed.  Patient agreed to use safety plan should any safety concerns arise.  A copy was given to the patient.    Appearance:   Appropriate   Eye Contact:   Good   Psychomotor Behavior: Normal    Attitude:   Cooperative   Orientation:   All  Speech   Rate / Production: Normal    Volume:  Normal   Mood:    Anxious  Sad   Affect:    Appropriate  Tearful  Thought Content:  Clear   Thought Form:  Coherent  Logical   Insight:    Good        Diagnoses:  1. Bipolar affective disorder, remission status unspecified (H)    2. Social anxiety disorder    3. CATRACHITO (generalized anxiety disorder)        Collateral Reports Completed:  Communicated with: Dr. Argueta    Plan: (Homework, other):  Patient was given information about behavioral services and encouraged to schedule a follow up [facility]  She was also given information about mental health symptoms and treatment options .  CD Recommendations: No indications of CD issues.      Lakhwinder Chung PsyD, LP      Jerry Chung PsyD  March 25, 2022      ______________________________________________________________________    [facility] Collaborative Care Psychiatry Service ([facility] Treatment Plan    Patient's Name: Marija Langley  YOB: 2000    Date of Creation: March 25, 2022  Date Treatment Plan Last Reviewed/Revised: 03/25/2022    DSM5 Diagnoses: 296.40 Bipolar I Disorder Most Recent Episode Hypomanic, Unspecified or 300.23 (F40.10) Social Anxiety Disorder  300.02 (F41.1) Generalized Anxiety Disorder  Psychosocial / Contextual Factors: work, school, grief, relationships  PROMIS (reviewed every 90 days):   PROMIS 10-Global Health (only subscores and total score):   PROMIS-10 Scores Only 1/3/2022   Global Mental Health Score 10   Global Physical Health Score 14   PROMIS TOTAL - SUBSCORES 24       Referral / Collaboration:  Referral to another professional/service is not indicated at this time..    Anticipated number of session for this episode of care: 5-6  Anticipation frequency of session: As determined by Dr. Argueta  Anticipated Duration of each session: 16-37 minutes  Treatment plan will be reviewed in 90 days or when  goals have been changed.       MeasurableTreatment Goal(s) related to diagnosis / functional impairment(s)  Goal 1: Patient will work with providers to manage symptoms    I will know I've met my goal when I'm less anxious.      Objective #A (Patient Action)  Patient will attend all appointments, take medication as prescribed.  Status: New - Date: 03/25/2022     Intervention(s)  [facility] will Monitor and assist in overcoming barriers to treatment adherence    Objective #B  Patient will consider all recommendations offered.  Status: New - Date: 03/25/2022      Intervention(s)  [facility] will educate patient on treatment options, clarify concerns, work with pt to overcome any resistance to compliance.    Patient has reviewed and agreed to the above plan.      Jerry Chung PsyD  March 25, 2022

## 2022-03-26 ASSESSMENT — ANXIETY QUESTIONNAIRES: GAD7 TOTAL SCORE: 10

## 2022-04-05 ENCOUNTER — VIRTUAL VISIT (OUTPATIENT)
Dept: PSYCHOLOGY | Facility: CLINIC | Age: 22
End: 2022-04-05
Payer: COMMERCIAL

## 2022-04-05 DIAGNOSIS — F40.10 SOCIAL ANXIETY DISORDER: ICD-10-CM

## 2022-04-05 DIAGNOSIS — F31.9 BIPOLAR AFFECTIVE DISORDER, REMISSION STATUS UNSPECIFIED (H): Primary | ICD-10-CM

## 2022-04-05 DIAGNOSIS — F41.1 GAD (GENERALIZED ANXIETY DISORDER): ICD-10-CM

## 2022-04-05 PROCEDURE — 90837 PSYTX W PT 60 MINUTES: CPT | Mod: 95 | Performed by: PSYCHOLOGIST

## 2022-04-05 NOTE — PROGRESS NOTES
[facility] - Collaborative Care Psychiatry Service (CCPS)  April 5, 2022      Behavioral Health Clinician Progress Note    Patient Name: Marija Langley         Service Type:  Individual      Service Location:   [facility]     Session Start Time: 04:00pm  Session End Time: 04:55pm      Session Length: 53 - 60      Attendees: Patient     Service Modality:  Video Visit:      Provider verified identity through the following two step process.  Patient provided:  Patient is known previously to provider    Telemedicine Visit: The patient's condition can be safely assessed and treated via synchronous audio and visual telemedicine encounter.      Reason for Telemedicine Visit: Services only offered telehealth    Originating Site (Patient Location): Patient's home    Distant Site (Provider Location): Provider Remote Setting- Home Office    Consent:  The patient/guardian has verbally consented to: the potential risks and benefits of telemedicine (video visit) versus in person care; bill my insurance or make self-payment for services provided; and responsibility for payment of non-covered services.     Patient would like the video invitation sent by:  My Chart    Mode of Communication:  Video Conference via [facility]    As the provider I attest to compliance with applicable laws and regulations related to telemedicine.    Visit Activities (Refresh list every visit): [facility] Only    Diagnostic Assessment Date: 07/30/2021  Treatment Plan Review Date: 04/25/2022  See Flowsheets for today's PHQ-9 and CATRACHITO-7 results  Previous PHQ-9:   PHQ-9 SCORE 2/11/2022 3/22/2022 3/25/2022   PHQ-9 Total Score MyChart 10 (Moderate depression) 12 (Moderate depression) 12 (Moderate depression)   PHQ-9 Total Score 10 12 12       Previous CATRACHITO-7:   CATRACHITO-7 SCORE 1/3/2022 2/11/2022 3/25/2022   Total Score 10 (moderate anxiety) 13 (moderate anxiety) 10 (moderate anxiety)   Total Score 10 13 10       WHODAS  WHODAS 2.0 Total Score 7/29/2021   Total Score 33    Total Score MyChart 33        CAGE  CAGE-AID Flowsheet 7/29/2021   Have you ever felt you should Cut down on your drinking or drug use? 0   Have people Annoyed you by criticizing your drinking or drug use? 0   Have you ever felt bad or Guilty about your drinking or drug use? 0   Have you ever had a drink or used drugs first thing in the morning to steady your nerves or to get rid of a hangover? (Eye opener) 0   CAGE-AID SCORE 0   1. Have you felt you ought to cut down on your drinking or drug use? No   2. Have people annoyed you by criticizing your drinking or drug use? No   3. Have you felt bad or guilty about your drinking or drug use? No   4. Have you ever had a drink or used drugs first thing in the morning to steady your nerves or to get rid of a hangover (eye opener)? No   CAGE-AID SCORE 0 (A total score of 2 or greater is considered clinically significant)     DATA  Extended Session (60+ minutes): No  Interactive Complexity: No  Crisis: No  [facility] Patient: No    Treatment Objective(s) Addressed in This Session:  Target Behavior(s): Anxiety management    Anxiety: will develop more effective coping skills to manage anxiety symptoms    Medication Compliance:  Yes      Chemical Use Review:   Substance Use: Chemical use reviewed, no active concerns identified      Tobacco Use: No current tobacco use.      Current Stressors/Issues:  Marija spoke about how being more accepting of her anxiety has been helpful for her. We focused on recognizing the purpose of her negative thoughts and social anxiety. She was able to notice how they are attempts to help her though are not very helpful. She indicated having a clearer understanding of the purpose of the skills in changing the relationship she has with her thoughts/feelings.       Progress on Treatment Objective(s) / Homework:  Satisfactory progress - ACTION (Actively working towards change); Intervened by reinforcing change plan / affirming steps taken    Motivational  Interviewing    MI Intervention: Expressed Empathy/Understanding, Supported Autonomy, Collaboration, Evocation, Open-ended questions, Reflections: simple and complex, Change talk (evoked) and Reframe     Change Talk Expressed by the Patient: Activation Taking steps    Provider Response to Change Talk: E - Evoked more info from patient about behavior change, A - Affirmed patient's thoughts, decisions, or attempts at behavior change, R - Reflected patient's change talk and S - Summarized patient's change talk statements      Also provided psychoeducation about behavioral health condition, symptoms, and treatment options    Care Plan review completed: Yes    Medication Review:  Changes to psychiatric medications, see updated Medication List in EPIC.     Medication Compliance:  Yes    Changes in Health Issues:   None reported    Chemical Use Review:   Substance Use: Chemical use reviewed, no active concerns identified      Tobacco Use: No current tobacco use.        Assessment: Current Emotional / Mental Status (status of significant symptoms):  Risk status (Self / Other harm or suicidal ideation)  Patient has had a history of suicidal ideation: in the pasty and suicide attempts: as a teenager  Patient denies current fears or concerns for personal safety.  Patient denies current or recent suicidal ideation or behaviors.  Patient denies current or recent homicidal ideation or behaviors.  Patient denies current or recent self injurious behavior or ideation.  Patient denies other safety concerns.  A safety and risk management plan has been developed including: Patient consented to co-developed safety plan.  A safety and risk management plan was completed.  Patient agreed to use safety plan should any safety concerns arise.  A copy was given to the patient.    Appearance:   Appropriate   Eye Contact:   Good   Psychomotor Behavior: Normal   Attitude:   Cooperative   Orientation:   All  Speech   Rate / Production: Normal     Volume:  Normal   Mood:    Anxious   Affect:    Appropriate   Thought Content:  Clear   Thought Form:  Coherent  Logical   Insight:    Good        Diagnoses:  1. Bipolar affective disorder, remission status unspecified (H)    2. CATRACHITO (generalized anxiety disorder)    3. Social anxiety disorder        Collateral Reports Completed:  Not Applicable    Plan: (Homework, other):  Patient was given information about behavioral services and encouraged to schedule a follow up in 2 weeks.  She was also given information about mental health symptoms and treatment options .  CD Recommendations: No indications of CD issues.      Lakhwinder Chung PsyD, LP      Jerry Chung PsyD  March 22, 2022      ______________________________________________________________________    [facility] Care Psychiatry Service (CCPS) Treatment Plan    Patient's Name: Marija Langley  YOB: 2000    Date: January 25, 2022  Date Treatment Plan Last Reviewed/Revised: 01/25/2022    DSM5 Diagnoses: 296.41 Bipolar I Disorder Current or Most Recent Episode Manic, Mild  or 300.23 (F40.10) Social Anxiety Disorder  300.02 (F41.1) Generalized Anxiety Disorder  Psychosocial / Contextual Factors: struggling with attending class, work    PROMIS (reviewed every 90 days):   PROMIS 10-Global Health (only subscores and total score):   PROMIS-10 Scores Only 1/3/2022   Global Mental Health Score 10   Global Physical Health Score 14   PROMIS TOTAL - SUBSCORES 24       Referral / Collaboration:  Referral to another professional/service is not indicated at this time..    Anticipated number of session or this episode of care: 6-8  Anticipation frequency of session: Every other week  Anticipated Duration of each session: 53 or more minutes  Treatment plan will be reviewed in 90 days or when goals have been changed.       MeasurableTreatment Goal(s) related to diagnosis / functional impairment(s)  Goal 1: Patient will report increased value  directed behaviors    I will know I've met my goal when I can join groups and talk to people more easily.       Objective #A (Patient Action)                          Patient will identify the cost of control/avoidance behaviors as a coping strategy.  Status: New - Date: 01/25/2022     Intervention(s)  [facility] will utilize exercises/worksheets to teach initial concepts (e.g., Choice Point, Life Turnaround, D.O.T.S.)     Objective #B  Patient will practice mindfulness skills.  Status: New - Date: 01/25/2022    Intervention(s)  [facility] will teach various mindfulness techniques (e.g., Notice 5 Things, 10 Mindful breaths, Leaves on a Stream)    Objective #C  Patient will clarify personal values for her life that positively impact behavior choices.  Status: New - Date: 01/25/2022     Intervention(s)  [facility] will help clarify values via exercises (e.g., values card sort) and worksheets (e.g., Jacquie)     Objective #D  Patient will report increased value determined behaviors with decreased negative impact of symptoms.  Status: New - Date: 01/25/2022     Intervention(s)  [facility] will assign goal setting/committed action homework in service of values      Patient has reviewed and agreed to the above plan.      Jerry Chung PsyD  January 25, 2022

## 2022-04-18 ENCOUNTER — MYC REFILL (OUTPATIENT)
Dept: PSYCHOLOGY | Facility: CLINIC | Age: 22
End: 2022-04-18
Payer: COMMERCIAL

## 2022-04-18 NOTE — TELEPHONE ENCOUNTER
"Refill request r'cd from [facility] via fax for Doxepin 10 mg/ml denied due to No longer under provider care. Faxed \"not authorized\" back to pharmacy.    Betsy Lock RN April 18, 2022 9:32 AM    "

## 2022-04-20 NOTE — TELEPHONE ENCOUNTER
Second request for this medication. RN spoke with [facility] pharmacist and stated this patient is no longer seeing Dr. Argueta. Medication request denied.     Betsy Lock RN on 4/20/2022 at 9:32 AM

## 2022-05-09 NOTE — TELEPHONE ENCOUNTER
"Third request for medication. RN faxed back \"No longer under provider care.\"    Betsy Lock RN on 5/9/2022 at 10:40 AM      "

## 2022-06-08 ENCOUNTER — VIRTUAL VISIT (OUTPATIENT)
Dept: FAMILY MEDICINE | Facility: CLINIC | Age: 22
End: 2022-06-08
Payer: COMMERCIAL

## 2022-06-08 DIAGNOSIS — G47.00 INSOMNIA, UNSPECIFIED TYPE: ICD-10-CM

## 2022-06-08 DIAGNOSIS — Z11.59 NEED FOR HEPATITIS C SCREENING TEST: ICD-10-CM

## 2022-06-08 DIAGNOSIS — Z11.3 SCREENING FOR STDS (SEXUALLY TRANSMITTED DISEASES): ICD-10-CM

## 2022-06-08 DIAGNOSIS — F31.9 BIPOLAR AFFECTIVE DISORDER, REMISSION STATUS UNSPECIFIED (H): Primary | ICD-10-CM

## 2022-06-08 DIAGNOSIS — Z13.220 LIPID SCREENING: ICD-10-CM

## 2022-06-08 DIAGNOSIS — Z51.81 ENCOUNTER FOR THERAPEUTIC DRUG MONITORING: ICD-10-CM

## 2022-06-08 DIAGNOSIS — Z11.4 SCREENING FOR HIV (HUMAN IMMUNODEFICIENCY VIRUS): ICD-10-CM

## 2022-06-08 DIAGNOSIS — F41.1 GAD (GENERALIZED ANXIETY DISORDER): ICD-10-CM

## 2022-06-08 PROCEDURE — 99214 OFFICE O/P EST MOD 30 MIN: CPT | Mod: 95 | Performed by: PHYSICIAN ASSISTANT

## 2022-06-08 RX ORDER — PROPRANOLOL HYDROCHLORIDE 80 MG/1
80 CAPSULE, EXTENDED RELEASE ORAL DAILY
Qty: 90 CAPSULE | Refills: 1 | Status: SHIPPED | OUTPATIENT
Start: 2022-06-08

## 2022-06-08 RX ORDER — BUSPIRONE HYDROCHLORIDE 10 MG/1
20 TABLET ORAL 2 TIMES DAILY
Qty: 360 TABLET | Refills: 1 | Status: SHIPPED | OUTPATIENT
Start: 2022-06-08

## 2022-06-08 RX ORDER — LAMOTRIGINE 200 MG/1
200 TABLET ORAL DAILY
Qty: 90 TABLET | Refills: 1 | Status: SHIPPED | OUTPATIENT
Start: 2022-06-08

## 2022-06-08 RX ORDER — DOXEPIN HYDROCHLORIDE 10 MG/ML
5-10 SOLUTION ORAL
Qty: 118 ML | Refills: 1 | Status: SHIPPED | OUTPATIENT
Start: 2022-06-08

## 2022-06-08 ASSESSMENT — ANXIETY QUESTIONNAIRES
6. BECOMING EASILY ANNOYED OR IRRITABLE: SEVERAL DAYS
7. FEELING AFRAID AS IF SOMETHING AWFUL MIGHT HAPPEN: NOT AT ALL
5. BEING SO RESTLESS THAT IT IS HARD TO SIT STILL: SEVERAL DAYS
GAD7 TOTAL SCORE: 8
7. FEELING AFRAID AS IF SOMETHING AWFUL MIGHT HAPPEN: NOT AT ALL
1. FEELING NERVOUS, ANXIOUS, OR ON EDGE: SEVERAL DAYS
GAD7 TOTAL SCORE: 8
2. NOT BEING ABLE TO STOP OR CONTROL WORRYING: MORE THAN HALF THE DAYS
4. TROUBLE RELAXING: SEVERAL DAYS
GAD7 TOTAL SCORE: 8
3. WORRYING TOO MUCH ABOUT DIFFERENT THINGS: MORE THAN HALF THE DAYS
8. IF YOU CHECKED OFF ANY PROBLEMS, HOW DIFFICULT HAVE THESE MADE IT FOR YOU TO DO YOUR WORK, TAKE CARE OF THINGS AT HOME, OR GET ALONG WITH OTHER PEOPLE?: SOMEWHAT DIFFICULT

## 2022-06-08 ASSESSMENT — PATIENT HEALTH QUESTIONNAIRE - PHQ9
10. IF YOU CHECKED OFF ANY PROBLEMS, HOW DIFFICULT HAVE THESE PROBLEMS MADE IT FOR YOU TO DO YOUR WORK, TAKE CARE OF THINGS AT HOME, OR GET ALONG WITH OTHER PEOPLE: SOMEWHAT DIFFICULT
SUM OF ALL RESPONSES TO PHQ QUESTIONS 1-9: 9
SUM OF ALL RESPONSES TO PHQ QUESTIONS 1-9: 9

## 2022-06-08 NOTE — PROGRESS NOTES
Marija is a 22 year old who is being evaluated via a billable video visit.      How would you like to obtain your AVS? MyChart  If the video visit is dropped, the invitation should be resent by: Text to cell phone: [phone]  Will anyone else be joining your video visit? No    Video Start Time: 1:41 PM    Assessment & Plan     (F31.9) Bipolar affective disorder, remission status unspecified (H)  (primary encounter diagnosis)  Comment: stable  Plan: lamoTRIgine (LAMICTAL) 200 MG tablet, CBC, CMP    (F41.1) CATRACHITO (generalized anxiety disorder)  Comment: stable  Plan: propranolol ER (INDERAL LA) 80 MG 24 hr         capsule, busPIRone (BUSPAR) 10 MG tablet    (G47.00) Insomnia, unspecified type  Comment: stable  Plan: doxepin (SINEQUAN) 10 MG/ML (HIGH CONC)         solution    (Z51.81) Encounter for therapeutic drug monitoring  Comment: stable  Plan: CBC with platelets, Comprehensive metabolic         panel (BMP + Alb, Alk Phos, ALT, AST, Total.         Bili, TP)    (Z11.3) Screening for STDs (sexually transmitted diseases)  Plan: CHLAMYDIA TRACHOMATIS PCR    (Z11.4) Screening for HIV (human immunodeficiency virus)  Plan: HIV Antigen Antibody Combo    (Z11.59) Need for hepatitis C screening test  Plan: Hepatitis C Screen Reflex to HCV RNA Quant and         Genotype    (Z13.220) Lipid screening  Plan: Lipid panel reflex to direct LDL Non-fasting    Patient Instructions   Labs    Refills    Decided against 3rd dose HPV vaccine    Establish with new provider in [place]      Return in about 3 months (around 9/8/2022).    NEIL Randle [facility]    Subjective   Marija is a 22 year old who presents for the following health issues     History of Present Illness       Mental Health Follow-up:  Patient presents to follow-up on Depression & Anxiety.Patient's depression since last visit has been:  Better  The patient is having other symptoms associated with depression.  Patient's anxiety  since last visit has been:  Better  The patient is having other symptoms associated with anxiety.  Any significant life events: other  Patient is feeling anxious or having panic attacks.  Patient has no concerns about alcohol or drug use.    She eats 2-3 servings of fruits and vegetables daily.She consumes 1 sweetened beverage(s) daily.She exercises with enough effort to increase her heart rate 60 or more minutes per day.  She exercises with enough effort to increase her heart rate 5 days per week.   She is taking medications regularly.    Today's PHQ-9         PHQ-9 Total Score: 9    PHQ-9 Q9 Thoughts of better off dead/self-harm past 2 weeks :   Not at all    How difficult have these problems made it for you to do your work, take care of things at home, or get along with other people: Somewhat difficult  Today's CATRACHITO-7 Score: 8     Was seeing Dr Argueta, transferring care back to me now, however also moving to [place] at the end of the month.    On lamotrigine and buspar, propranolol.  Some consideration for increasing buspar but feels overall doing well enough.    Doxepin prn sleep - couple times a night.    Bipolar affective.  History anorexia and bulimia.  Wanting referral.  Not on meds currently and feels med didn't do well in past.  Has some anxiety currently but feels she's good to wait until gets referrals.  Feels eating disorder doing a lot better.    Excited to move to warmer temps.  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            Objective           Vitals:  No vitals were obtained today due to virtual visit.          Video-Visit Details    Type of service:  Video Visit    Video End Time:1:53 PM    Originating Location (pt. Location): Home    Distant Location (provider location):  [facility]     Platform used for Video Visit: [employer] [employer]

## 2022-06-08 NOTE — TELEPHONE ENCOUNTER
RN spoke with [facility] Pharmacist who stated she will delete request. RN stated that we have been receiving several requests for each medication that Dr. Argueta prescribed.     Appears phone connection was lost    RN Denying request for Propanolol 80 mg.    Betsy Lock RN on 6/8/2022 at 10:33 AM

## 2022-06-24 ENCOUNTER — LAB (OUTPATIENT)
Dept: LAB | Facility: CLINIC | Age: 22
End: 2022-06-24
Payer: COMMERCIAL

## 2022-06-24 DIAGNOSIS — Z11.4 SCREENING FOR HIV (HUMAN IMMUNODEFICIENCY VIRUS): ICD-10-CM

## 2022-06-24 DIAGNOSIS — Z11.3 SCREENING FOR STDS (SEXUALLY TRANSMITTED DISEASES): ICD-10-CM

## 2022-06-24 DIAGNOSIS — Z51.81 ENCOUNTER FOR THERAPEUTIC DRUG MONITORING: ICD-10-CM

## 2022-06-24 DIAGNOSIS — Z11.59 NEED FOR HEPATITIS C SCREENING TEST: ICD-10-CM

## 2022-06-24 DIAGNOSIS — Z13.220 LIPID SCREENING: ICD-10-CM

## 2022-06-24 LAB
ALBUMIN SERPL-MCNC: 4 G/DL (ref 3.4–5)
ALP SERPL-CCNC: 50 U/L (ref 40–150)
ALT SERPL W P-5'-P-CCNC: 17 U/L (ref 0–50)
ANION GAP SERPL CALCULATED.3IONS-SCNC: <1 MMOL/L (ref 3–14)
AST SERPL W P-5'-P-CCNC: 17 U/L (ref 0–45)
BILIRUB SERPL-MCNC: 0.5 MG/DL (ref 0.2–1.3)
BUN SERPL-MCNC: 9 MG/DL (ref 7–30)
CALCIUM SERPL-MCNC: 9.6 MG/DL (ref 8.5–10.1)
CHLORIDE BLD-SCNC: 109 MMOL/L (ref 94–109)
CHOLEST SERPL-MCNC: 150 MG/DL
CO2 SERPL-SCNC: 34 MMOL/L (ref 20–32)
CREAT SERPL-MCNC: 0.85 MG/DL (ref 0.52–1.04)
ERYTHROCYTE [DISTWIDTH] IN BLOOD BY AUTOMATED COUNT: 12.2 % (ref 10–15)
FASTING STATUS PATIENT QL REPORTED: NO
GFR SERPL CREATININE-BSD FRML MDRD: >90 ML/MIN/1.73M2
GLUCOSE BLD-MCNC: 92 MG/DL (ref 70–99)
HCT VFR BLD AUTO: 35.6 % (ref 35–47)
HDLC SERPL-MCNC: 80 MG/DL
HGB BLD-MCNC: 11.9 G/DL (ref 11.7–15.7)
LDLC SERPL CALC-MCNC: 51 MG/DL
MCH RBC QN AUTO: 32.1 PG (ref 26.5–33)
MCHC RBC AUTO-ENTMCNC: 33.4 G/DL (ref 31.5–36.5)
MCV RBC AUTO: 96 FL (ref 78–100)
NONHDLC SERPL-MCNC: 70 MG/DL
PLATELET # BLD AUTO: 242 10E3/UL (ref 150–450)
POTASSIUM BLD-SCNC: 4.3 MMOL/L (ref 3.4–5.3)
PROT SERPL-MCNC: 6.9 G/DL (ref 6.8–8.8)
RBC # BLD AUTO: 3.71 10E6/UL (ref 3.8–5.2)
SODIUM SERPL-SCNC: 143 MMOL/L (ref 133–144)
TRIGL SERPL-MCNC: 94 MG/DL
WBC # BLD AUTO: 4.3 10E3/UL (ref 4–11)

## 2022-06-24 PROCEDURE — 85027 COMPLETE CBC AUTOMATED: CPT

## 2022-06-24 PROCEDURE — 80053 COMPREHEN METABOLIC PANEL: CPT

## 2022-06-24 PROCEDURE — 86803 HEPATITIS C AB TEST: CPT

## 2022-06-24 PROCEDURE — 87389 HIV-1 AG W/HIV-1&-2 AB AG IA: CPT

## 2022-06-24 PROCEDURE — 36415 COLL VENOUS BLD VENIPUNCTURE: CPT

## 2022-06-24 PROCEDURE — 87491 CHLMYD TRACH DNA AMP PROBE: CPT

## 2022-06-24 PROCEDURE — 80061 LIPID PANEL: CPT

## 2022-06-25 LAB
C TRACH DNA SPEC QL NAA+PROBE: NEGATIVE
HCV AB SERPL QL IA: NONREACTIVE
HIV 1+2 AB+HIV1 P24 AG SERPL QL IA: NONREACTIVE

## 2022-07-16 ENCOUNTER — HEALTH MAINTENANCE LETTER (OUTPATIENT)
Age: 22
End: 2022-07-16

## 2022-09-18 ENCOUNTER — HEALTH MAINTENANCE LETTER (OUTPATIENT)
Age: 22
End: 2022-09-18

## 2023-07-30 ENCOUNTER — HEALTH MAINTENANCE LETTER (OUTPATIENT)
Age: 23
End: 2023-07-30

## 2024-09-22 ENCOUNTER — HEALTH MAINTENANCE LETTER (OUTPATIENT)
Age: 24
End: 2024-09-22